# Patient Record
Sex: FEMALE | Race: BLACK OR AFRICAN AMERICAN | Employment: UNEMPLOYED | URBAN - METROPOLITAN AREA
[De-identification: names, ages, dates, MRNs, and addresses within clinical notes are randomized per-mention and may not be internally consistent; named-entity substitution may affect disease eponyms.]

---

## 2017-04-01 PROBLEM — I70.1 RAS (RENAL ARTERY STENOSIS) (HCC): Status: ACTIVE | Noted: 2017-04-01

## 2019-11-04 ENCOUNTER — HOSPITAL ENCOUNTER (EMERGENCY)
Age: 39
Discharge: LEFT BEFORE DISCHARGE | End: 2019-11-04
Payer: MEDICAID

## 2019-11-04 VITALS
HEIGHT: 61 IN | BODY MASS INDEX: 22.66 KG/M2 | SYSTOLIC BLOOD PRESSURE: 193 MMHG | DIASTOLIC BLOOD PRESSURE: 99 MMHG | WEIGHT: 120 LBS | RESPIRATION RATE: 14 BRPM | OXYGEN SATURATION: 95 % | HEART RATE: 98 BPM | TEMPERATURE: 98.3 F

## 2019-11-04 DIAGNOSIS — I10 ESSENTIAL HYPERTENSION: ICD-10-CM

## 2019-11-04 DIAGNOSIS — R20.0 NUMBNESS: Primary | ICD-10-CM

## 2019-11-04 DIAGNOSIS — R29.898 WEAKNESS OF RIGHT HAND: ICD-10-CM

## 2019-11-04 PROCEDURE — 99283 EMERGENCY DEPT VISIT LOW MDM: CPT

## 2019-11-04 RX ORDER — METHYLPREDNISOLONE 4 MG/1
TABLET ORAL
Qty: 1 KIT | Status: SHIPPED | OUTPATIENT
Start: 2019-11-04 | End: 2019-11-10

## 2019-11-07 ENCOUNTER — OFFICE VISIT (OUTPATIENT)
Dept: NEUROLOGY | Age: 39
End: 2019-11-07
Payer: MEDICAID

## 2019-11-07 VITALS
OXYGEN SATURATION: 53 % | SYSTOLIC BLOOD PRESSURE: 195 MMHG | WEIGHT: 125 LBS | DIASTOLIC BLOOD PRESSURE: 102 MMHG | BODY MASS INDEX: 23.6 KG/M2 | HEART RATE: 100 BPM | HEIGHT: 61 IN | RESPIRATION RATE: 18 BRPM

## 2019-11-07 DIAGNOSIS — G56.21 ULNAR NEUROPATHY OF RIGHT UPPER EXTREMITY: ICD-10-CM

## 2019-11-07 DIAGNOSIS — I10 HYPERTENSION, UNSPECIFIED TYPE: ICD-10-CM

## 2019-11-07 DIAGNOSIS — G56.03 BILATERAL CARPAL TUNNEL SYNDROME: Primary | ICD-10-CM

## 2019-11-07 DIAGNOSIS — R29.898 ARM WEAKNESS: ICD-10-CM

## 2019-11-07 PROCEDURE — 1036F TOBACCO NON-USER: CPT | Performed by: CLINICAL NURSE SPECIALIST

## 2019-11-07 PROCEDURE — G8599 NO ASA/ANTIPLAT THER USE RNG: HCPCS | Performed by: CLINICAL NURSE SPECIALIST

## 2019-11-07 PROCEDURE — G8420 CALC BMI NORM PARAMETERS: HCPCS | Performed by: CLINICAL NURSE SPECIALIST

## 2019-11-07 PROCEDURE — G8484 FLU IMMUNIZE NO ADMIN: HCPCS | Performed by: CLINICAL NURSE SPECIALIST

## 2019-11-07 PROCEDURE — G8427 DOCREV CUR MEDS BY ELIG CLIN: HCPCS | Performed by: CLINICAL NURSE SPECIALIST

## 2019-11-07 PROCEDURE — 99205 OFFICE O/P NEW HI 60 MIN: CPT | Performed by: CLINICAL NURSE SPECIALIST

## 2019-11-07 RX ORDER — BUPRENORPHINE AND NALOXONE 8; 2 MG/1; MG/1
FILM, SOLUBLE BUCCAL; SUBLINGUAL
Refills: 0 | COMMUNITY
Start: 2019-10-28

## 2019-11-07 RX ORDER — LAMOTRIGINE 200 MG/1
TABLET ORAL
Refills: 0 | COMMUNITY
Start: 2019-11-05

## 2019-11-07 RX ORDER — OXYBUTYNIN CHLORIDE 5 MG/1
TABLET ORAL
Refills: 0 | COMMUNITY
Start: 2019-10-28

## 2019-11-07 RX ORDER — CARVEDILOL 25 MG/1
TABLET ORAL
Refills: 0 | COMMUNITY
Start: 2019-08-24

## 2019-11-07 RX ORDER — LOSARTAN POTASSIUM 100 MG/1
TABLET ORAL
Refills: 0 | COMMUNITY
Start: 2019-10-28

## 2019-11-08 ENCOUNTER — TELEPHONE (OUTPATIENT)
Dept: NEUROLOGY | Age: 39
End: 2019-11-08

## 2019-11-18 ENCOUNTER — TELEPHONE (OUTPATIENT)
Dept: ADMINISTRATIVE | Age: 39
End: 2019-11-18

## 2019-11-19 ENCOUNTER — TELEPHONE (OUTPATIENT)
Dept: NEUROLOGY | Age: 39
End: 2019-11-19

## 2019-12-05 ENCOUNTER — TELEPHONE (OUTPATIENT)
Dept: NEUROLOGY | Age: 39
End: 2019-12-05

## 2019-12-11 ENCOUNTER — TELEPHONE (OUTPATIENT)
Dept: NEUROLOGY | Age: 39
End: 2019-12-11

## 2019-12-18 ENCOUNTER — TELEPHONE (OUTPATIENT)
Dept: NEUROLOGY | Age: 39
End: 2019-12-18

## 2020-01-06 ENCOUNTER — TELEPHONE (OUTPATIENT)
Dept: NEUROLOGY | Age: 40
End: 2020-01-06

## 2020-01-06 NOTE — TELEPHONE ENCOUNTER
Patient no showed 01/06/2020 with Liset CHASE. Attempted to contact patient to reschedule the appointment, left message. No show letter sent.

## 2020-01-06 NOTE — LETTER
[facility]  [address] [place] [address], [address] [place] [address] Drive  [address] [place] [address]  Phone: [phone]  Fax: [phone]        January 6, 2020     [address] [place], [place] [id number]      Dear Ximena Peña: We are sorry that you missed your appointment with Neo CHASE on 1/6/2020. Your health and follow-up medical care are important to us. We tried to reach you but your voice mailbox is full. Please call our office as soon as possible so that we may reschedule your appointment. If you have already rescheduled your appointment, please disregard this letter.     Sincerely,      BERLIN Padgett on behalf of:  RENA Zhu CNS

## 2020-03-25 PROBLEM — D64.9 ANEMIA: Status: RESOLVED | Noted: 2020-03-25 | Resolved: 2020-03-24

## 2020-05-27 ENCOUNTER — HOSPITAL ENCOUNTER (OUTPATIENT)
Age: 40
Discharge: HOME | End: 2020-05-27
Payer: MEDICAID

## 2020-05-27 LAB
ALBUMIN SERPL-MCNC: 4.3 G/DL (ref 3.5–5.2)
ALP BLD-CCNC: 53 U/L (ref 35–104)
ALT SERPL-CCNC: 9 U/L (ref 0–32)
ANION GAP SERPL CALCULATED.3IONS-SCNC: 13 MMOL/L (ref 7–16)
AST SERPL-CCNC: 14 U/L (ref 0–31)
BASOPHILS ABSOLUTE: 0.04 E9/L (ref 0–0.2)
BASOPHILS RELATIVE PERCENT: 0.6 % (ref 0–2)
BILIRUB SERPL-MCNC: 0.2 MG/DL (ref 0–1.2)
BUN BLDV-MCNC: 12 MG/DL (ref 6–20)
CALCIUM SERPL-MCNC: 8.9 MG/DL (ref 8.6–10.2)
CHLORIDE BLD-SCNC: 100 MMOL/L (ref 98–107)
CHOLESTEROL, TOTAL: 170 MG/DL (ref 0–199)
CO2: 27 MMOL/L (ref 22–29)
CREAT SERPL-MCNC: 0.9 MG/DL (ref 0.5–1)
EOSINOPHILS ABSOLUTE: 0.02 E9/L (ref 0.05–0.5)
EOSINOPHILS RELATIVE PERCENT: 0.3 % (ref 0–6)
GFR AFRICAN AMERICAN: >60
GFR NON-AFRICAN AMERICAN: >60 ML/MIN/1.73
GLUCOSE BLD-MCNC: 94 MG/DL (ref 74–99)
HBA1C MFR BLD: 5.6 % (ref 4–5.6)
HCT VFR BLD CALC: 30.9 % (ref 34–48)
HDLC SERPL-MCNC: 75 MG/DL
HEMOGLOBIN: 9.9 G/DL (ref 11.5–15.5)
IMMATURE GRANULOCYTES #: 0.01 E9/L
IMMATURE GRANULOCYTES %: 0.1 % (ref 0–5)
LDL CHOLESTEROL CALCULATED: 86 MG/DL (ref 0–99)
LYMPHOCYTES ABSOLUTE: 2.27 E9/L (ref 1.5–4)
LYMPHOCYTES RELATIVE PERCENT: 31.9 % (ref 20–42)
MCH RBC QN AUTO: 32.6 PG (ref 26–35)
MCHC RBC AUTO-ENTMCNC: 32 % (ref 32–34.5)
MCV RBC AUTO: 101.6 FL (ref 80–99.9)
MONOCYTES ABSOLUTE: 0.49 E9/L (ref 0.1–0.95)
MONOCYTES RELATIVE PERCENT: 6.9 % (ref 2–12)
NEUTROPHILS ABSOLUTE: 4.29 E9/L (ref 1.8–7.3)
NEUTROPHILS RELATIVE PERCENT: 60.2 % (ref 43–80)
PDW BLD-RTO: 12.1 FL (ref 11.5–15)
PHOSPHORUS: 3.2 MG/DL (ref 2.5–4.5)
PLATELET # BLD: 250 E9/L (ref 130–450)
PMV BLD AUTO: 11.6 FL (ref 7–12)
POTASSIUM SERPL-SCNC: 3.6 MMOL/L (ref 3.5–5)
RBC # BLD: 3.04 E12/L (ref 3.5–5.5)
SODIUM BLD-SCNC: 140 MMOL/L (ref 132–146)
TOTAL PROTEIN: 6.4 G/DL (ref 6.4–8.3)
TRIGL SERPL-MCNC: 47 MG/DL (ref 0–149)
VITAMIN D 25-HYDROXY: 6 NG/ML (ref 30–100)
VLDLC SERPL CALC-MCNC: 9 MG/DL
WBC # BLD: 7.1 E9/L (ref 4.5–11.5)

## 2020-05-27 PROCEDURE — 80061 LIPID PANEL: CPT

## 2020-05-27 PROCEDURE — 36415 COLL VENOUS BLD VENIPUNCTURE: CPT

## 2020-05-27 PROCEDURE — 85025 COMPLETE CBC W/AUTO DIFF WBC: CPT

## 2020-05-27 PROCEDURE — 83036 HEMOGLOBIN GLYCOSYLATED A1C: CPT

## 2020-05-27 PROCEDURE — 84100 ASSAY OF PHOSPHORUS: CPT

## 2020-05-27 PROCEDURE — 82306 VITAMIN D 25 HYDROXY: CPT

## 2020-05-27 PROCEDURE — 80053 COMPREHEN METABOLIC PANEL: CPT

## 2020-09-14 ENCOUNTER — HOSPITAL ENCOUNTER (OUTPATIENT)
Age: 40
Discharge: HOME | End: 2020-09-14
Payer: MEDICAID

## 2020-09-14 LAB
ALBUMIN SERPL-MCNC: 3.6 G/DL (ref 3.5–5.2)
ALP BLD-CCNC: 47 U/L (ref 35–104)
ALT SERPL-CCNC: 8 U/L (ref 0–32)
ANION GAP SERPL CALCULATED.3IONS-SCNC: 10 MMOL/L (ref 7–16)
AST SERPL-CCNC: 13 U/L (ref 0–31)
BASOPHILS ABSOLUTE: 0.03 E9/L (ref 0–0.2)
BASOPHILS RELATIVE PERCENT: 0.7 % (ref 0–2)
BILIRUB SERPL-MCNC: 0.2 MG/DL (ref 0–1.2)
BUN BLDV-MCNC: 6 MG/DL (ref 6–20)
CALCIUM SERPL-MCNC: 9 MG/DL (ref 8.6–10.2)
CHLORIDE BLD-SCNC: 102 MMOL/L (ref 98–107)
CHOLESTEROL, TOTAL: 169 MG/DL (ref 0–199)
CO2: 28 MMOL/L (ref 22–29)
CREAT SERPL-MCNC: 0.8 MG/DL (ref 0.5–1)
EOSINOPHILS ABSOLUTE: 0.02 E9/L (ref 0.05–0.5)
EOSINOPHILS RELATIVE PERCENT: 0.5 % (ref 0–6)
GFR AFRICAN AMERICAN: >60
GFR NON-AFRICAN AMERICAN: >60 ML/MIN/1.73
GLUCOSE BLD-MCNC: 98 MG/DL (ref 74–99)
HBA1C MFR BLD: 4.8 % (ref 4–5.6)
HCT VFR BLD CALC: 31.7 % (ref 34–48)
HDLC SERPL-MCNC: 92 MG/DL
HEMOGLOBIN: 10.1 G/DL (ref 11.5–15.5)
IMMATURE GRANULOCYTES #: 0.02 E9/L
IMMATURE GRANULOCYTES %: 0.5 % (ref 0–5)
LDL CHOLESTEROL CALCULATED: 68 MG/DL (ref 0–99)
LYMPHOCYTES ABSOLUTE: 1.71 E9/L (ref 1.5–4)
LYMPHOCYTES RELATIVE PERCENT: 40.4 % (ref 20–42)
MCH RBC QN AUTO: 32 PG (ref 26–35)
MCHC RBC AUTO-ENTMCNC: 31.9 % (ref 32–34.5)
MCV RBC AUTO: 100.3 FL (ref 80–99.9)
MONOCYTES ABSOLUTE: 0.35 E9/L (ref 0.1–0.95)
MONOCYTES RELATIVE PERCENT: 8.3 % (ref 2–12)
NEUTROPHILS ABSOLUTE: 2.1 E9/L (ref 1.8–7.3)
NEUTROPHILS RELATIVE PERCENT: 49.6 % (ref 43–80)
PDW BLD-RTO: 13.1 FL (ref 11.5–15)
PHOSPHORUS: 2.6 MG/DL (ref 2.5–4.5)
PLATELET # BLD: 307 E9/L (ref 130–450)
PMV BLD AUTO: 10.4 FL (ref 7–12)
POTASSIUM SERPL-SCNC: 3.9 MMOL/L (ref 3.5–5)
RBC # BLD: 3.16 E12/L (ref 3.5–5.5)
SODIUM BLD-SCNC: 140 MMOL/L (ref 132–146)
TOTAL PROTEIN: 5.9 G/DL (ref 6.4–8.3)
TRIGL SERPL-MCNC: 43 MG/DL (ref 0–149)
VITAMIN D 25-HYDROXY: 15 NG/ML (ref 30–100)
VLDLC SERPL CALC-MCNC: 9 MG/DL
WBC # BLD: 4.2 E9/L (ref 4.5–11.5)

## 2020-09-14 PROCEDURE — 84100 ASSAY OF PHOSPHORUS: CPT

## 2020-09-14 PROCEDURE — 82306 VITAMIN D 25 HYDROXY: CPT

## 2020-09-14 PROCEDURE — 80053 COMPREHEN METABOLIC PANEL: CPT

## 2020-09-14 PROCEDURE — 36415 COLL VENOUS BLD VENIPUNCTURE: CPT

## 2020-09-14 PROCEDURE — 83036 HEMOGLOBIN GLYCOSYLATED A1C: CPT

## 2020-09-14 PROCEDURE — 80061 LIPID PANEL: CPT

## 2020-09-14 PROCEDURE — 85025 COMPLETE CBC W/AUTO DIFF WBC: CPT

## 2020-11-03 PROBLEM — I10 ESSENTIAL HYPERTENSION: Status: RESOLVED | Noted: 2020-11-03 | Resolved: 2020-11-03

## 2022-10-24 ENCOUNTER — NURSE TRIAGE (OUTPATIENT)
Dept: OTHER | Facility: CLINIC | Age: 42
End: 2022-10-24

## 2022-10-24 NOTE — TELEPHONE ENCOUNTER
Location of patient: [address] call from Michelleketty Gonzalez at [facility] with [employer]. Subjective: Caller states \"I have numbness in my hands\"     Current Symptoms: Was diagnosed with carpal tunnel several years ago via nerve conduction study tests and was recommended to have surgery which she did not follow thru with due to anxiety about it . Hands are always numb. L hand is worse. Fingertips go completely numb when holding things. Inverted vertebrae on L lower spine and is starting to flare up again. Onset: several years ago; worsening    Associated Symptoms: reduced activity    Pain Severity: 4/10; numbness, tingling, pins and needles; constant, waxing and waning, moderate    Temperature: denies fever     What has been tried:     LMP: NA Pregnant: No    Recommended disposition: See in Office Today/UCC or Walk In as back up. Care advice provided, patient verbalizes understanding; denies any other questions or concerns; instructed to call back for any new or worsening symptoms. Patient/Caller agrees with recommended disposition; writer provided warm transfer to [facility] at [facility] for appointment scheduling    Attention Provider: Thank you for allowing me to participate in the care of your patient. The patient was connected to triage in response to information provided to the ECC/PSC. Please do not respond through this encounter as the response is not directed to a shared pool.     Reason for Disposition   Patient wants to be seen    Protocols used: Neurologic Deficit-ADULT-[place]

## 2022-11-18 ENCOUNTER — OFFICE VISIT (OUTPATIENT)
Dept: PRIMARY CARE CLINIC | Age: 42
End: 2022-11-18
Payer: MEDICAID

## 2022-11-18 VITALS
BODY MASS INDEX: 27.42 KG/M2 | WEIGHT: 149 LBS | SYSTOLIC BLOOD PRESSURE: 146 MMHG | HEART RATE: 81 BPM | OXYGEN SATURATION: 99 % | RESPIRATION RATE: 18 BRPM | DIASTOLIC BLOOD PRESSURE: 106 MMHG | HEIGHT: 62 IN

## 2022-11-18 DIAGNOSIS — N92.6 MENSTRUAL IRREGULARITY: ICD-10-CM

## 2022-11-18 DIAGNOSIS — I10 HYPERTENSION, UNSPECIFIED TYPE: ICD-10-CM

## 2022-11-18 DIAGNOSIS — I10 HYPERTENSION, UNSPECIFIED TYPE: Primary | ICD-10-CM

## 2022-11-18 DIAGNOSIS — E55.9 VITAMIN D DEFICIENCY: ICD-10-CM

## 2022-11-18 DIAGNOSIS — G56.03 BILATERAL CARPAL TUNNEL SYNDROME: ICD-10-CM

## 2022-11-18 LAB
ALBUMIN SERPL-MCNC: 4.3 G/DL (ref 3.5–5.2)
ALP BLD-CCNC: 53 U/L (ref 35–104)
ALT SERPL-CCNC: 11 U/L (ref 0–32)
ANION GAP SERPL CALCULATED.3IONS-SCNC: 14 MMOL/L (ref 7–16)
AST SERPL-CCNC: 20 U/L (ref 0–31)
BASOPHILS ABSOLUTE: 0.05 E9/L (ref 0–0.2)
BASOPHILS RELATIVE PERCENT: 0.8 % (ref 0–2)
BILIRUB SERPL-MCNC: 0.5 MG/DL (ref 0–1.2)
BUN BLDV-MCNC: 11 MG/DL (ref 6–20)
CALCIUM SERPL-MCNC: 9.8 MG/DL (ref 8.6–10.2)
CHLORIDE BLD-SCNC: 102 MMOL/L (ref 98–107)
CHOLESTEROL, TOTAL: 213 MG/DL (ref 0–199)
CO2: 22 MMOL/L (ref 22–29)
CREAT SERPL-MCNC: 0.9 MG/DL (ref 0.5–1)
EOSINOPHILS ABSOLUTE: 0.01 E9/L (ref 0.05–0.5)
EOSINOPHILS RELATIVE PERCENT: 0.2 % (ref 0–6)
GFR SERPL CREATININE-BSD FRML MDRD: >60 ML/MIN/1.73
GLUCOSE BLD-MCNC: 86 MG/DL (ref 74–99)
HCT VFR BLD CALC: 32.8 % (ref 34–48)
HDLC SERPL-MCNC: 111 MG/DL
HEMOGLOBIN: 10.8 G/DL (ref 11.5–15.5)
IMMATURE GRANULOCYTES #: 0.02 E9/L
IMMATURE GRANULOCYTES %: 0.3 % (ref 0–5)
LDL CHOLESTEROL CALCULATED: 89 MG/DL (ref 0–99)
LYMPHOCYTES ABSOLUTE: 2.69 E9/L (ref 1.5–4)
LYMPHOCYTES RELATIVE PERCENT: 45.1 % (ref 20–42)
MCH RBC QN AUTO: 29.7 PG (ref 26–35)
MCHC RBC AUTO-ENTMCNC: 32.9 % (ref 32–34.5)
MCV RBC AUTO: 90.1 FL (ref 80–99.9)
MONOCYTES ABSOLUTE: 0.38 E9/L (ref 0.1–0.95)
MONOCYTES RELATIVE PERCENT: 6.4 % (ref 2–12)
NEUTROPHILS ABSOLUTE: 2.81 E9/L (ref 1.8–7.3)
NEUTROPHILS RELATIVE PERCENT: 47.2 % (ref 43–80)
PDW BLD-RTO: 15.6 FL (ref 11.5–15)
PLATELET # BLD: 289 E9/L (ref 130–450)
PMV BLD AUTO: 12.3 FL (ref 7–12)
POTASSIUM SERPL-SCNC: 4.1 MMOL/L (ref 3.5–5)
RBC # BLD: 3.64 E12/L (ref 3.5–5.5)
SODIUM BLD-SCNC: 138 MMOL/L (ref 132–146)
TOTAL PROTEIN: 6.7 G/DL (ref 6.4–8.3)
TRIGL SERPL-MCNC: 65 MG/DL (ref 0–149)
TSH SERPL DL<=0.05 MIU/L-ACNC: 1.32 UIU/ML (ref 0.27–4.2)
VITAMIN D 25-HYDROXY: 14 NG/ML (ref 30–100)
VLDLC SERPL CALC-MCNC: 13 MG/DL
WBC # BLD: 6 E9/L (ref 4.5–11.5)

## 2022-11-18 PROCEDURE — 81025 URINE PREGNANCY TEST: CPT | Performed by: STUDENT IN AN ORGANIZED HEALTH CARE EDUCATION/TRAINING PROGRAM

## 2022-11-18 PROCEDURE — 3078F DIAST BP <80 MM HG: CPT | Performed by: STUDENT IN AN ORGANIZED HEALTH CARE EDUCATION/TRAINING PROGRAM

## 2022-11-18 PROCEDURE — 99204 OFFICE O/P NEW MOD 45 MIN: CPT | Performed by: STUDENT IN AN ORGANIZED HEALTH CARE EDUCATION/TRAINING PROGRAM

## 2022-11-18 PROCEDURE — 1036F TOBACCO NON-USER: CPT | Performed by: STUDENT IN AN ORGANIZED HEALTH CARE EDUCATION/TRAINING PROGRAM

## 2022-11-18 PROCEDURE — G8427 DOCREV CUR MEDS BY ELIG CLIN: HCPCS | Performed by: STUDENT IN AN ORGANIZED HEALTH CARE EDUCATION/TRAINING PROGRAM

## 2022-11-18 PROCEDURE — 3074F SYST BP LT 130 MM HG: CPT | Performed by: STUDENT IN AN ORGANIZED HEALTH CARE EDUCATION/TRAINING PROGRAM

## 2022-11-18 PROCEDURE — G8484 FLU IMMUNIZE NO ADMIN: HCPCS | Performed by: STUDENT IN AN ORGANIZED HEALTH CARE EDUCATION/TRAINING PROGRAM

## 2022-11-18 PROCEDURE — G8419 CALC BMI OUT NRM PARAM NOF/U: HCPCS | Performed by: STUDENT IN AN ORGANIZED HEALTH CARE EDUCATION/TRAINING PROGRAM

## 2022-11-18 RX ORDER — CHLORTHALIDONE 25 MG/1
TABLET ORAL
COMMUNITY
Start: 2022-09-11

## 2022-11-18 RX ORDER — LABETALOL 200 MG/1
TABLET, FILM COATED ORAL
COMMUNITY
Start: 2022-09-11 | End: 2022-11-18

## 2022-11-18 RX ORDER — LOSARTAN POTASSIUM 100 MG/1
100 TABLET ORAL DAILY
Qty: 30 TABLET | Refills: 0
Start: 2022-11-18

## 2022-11-18 NOTE — PROGRESS NOTES
[facility]  Department of Family Medicine      Patient:  Obdulia Pringle 40 y.o. female     Date of Service: 22      Chief complaint:   Chief Complaint   Patient presents with    Establish Care     HTN    Carpal Tunnel     Had an EGD done and was told she needed surgery but never got it done. she needs an order for an EGD now. History ofPresent Illness   The patient is a 40 y.o. female  presented to the clinic with complaints as above.     Bilateral Carpal tunnel  -chronic issue  -diagnosed years ago by EMG, needs repeat  -has been dropping stuff     HTN   -f/u  -following with Dr. Rodney Saldivar (nephrology)  -has been out of her medication for 3 months now  -has only been taking the HCTZ, not taking anything else as they made her not feel well     Menstrual irregularities  -has had IUD for years now, states hasn't had period for a year, has had a menstrual period for two weeks now     Past Medical History:      Diagnosis Date    Abnormal Pap smear     Anemia     Bradycardia     Cardiomyopathy ([address]     Hypertension     Migraine headache     miscarriage     multiple       PastSurgical History:        Procedure Laterality Date     SECTION, LOW TRANSVERSE Bilateral 05    GASTRIC BYPASS SURGERY         Allergies:    Norvasc [amlodipine]    Social History:   Social History     Socioeconomic History    Marital status: Legally      Spouse name: Not on file    Number of children: Not on file    Years of education: Not on file    Highest education level: Not on file   Occupational History    Not on file   Tobacco Use    Smoking status: Never    Smokeless tobacco: Never    Tobacco comments:     smokes cigars   Substance and Sexual Activity    Alcohol use: No     Comment: socially    Drug use: No    Sexual activity: Yes     Partners: Male   Other Topics Concern    Not on file   Social History Narrative    Not on file     Social Determinants of Health     Financial Resource Strain: Not on file   Food Insecurity: Not on file   Transportation Needs: Not on file   Physical Activity: Not on file   Stress: Not on file   Social Connections: Not on file   Intimate Partner Violence: Not on file   Housing Stability: Not on file        Family History:       Problem Relation Age of Onset    High Blood Pressure Other     Diabetes Other     High Blood Pressure Mother     Heart Failure Mother     Cancer Mother     Diabetes Mother     High Blood Pressure Father     Cancer Father     Diabetes Father     Cancer Maternal Grandfather        Review of Systems:   Review of Systems - as above     Physical Exam   Vitals: BP (!) 146/106   Pulse 81   Resp 18   Ht 5' 2\" (1.575 m)   Wt 149 lb (67.6 kg)   SpO2 99%   BMI 27.25 kg/m²   Physical Exam  Constitutional:       Appearance: She is well-developed. HENT:      Head: Normocephalic. Cardiovascular:      Rate and Rhythm: Normal rate and regular rhythm. Heart sounds: Normal heart sounds. No murmur heard. Pulmonary:      Effort: Pulmonary effort is normal. No respiratory distress. Breath sounds: Normal breath sounds. No wheezing. Abdominal:      General: Bowel sounds are normal.      Palpations: Abdomen is soft. Musculoskeletal:         General: Normal range of motion. Skin:     General: Skin is warm and dry. Neurological:      Mental Status: She is alert and oriented to person, place, and time. Comments: Carpal tunnel compression positive bilaterally  Phalens positive bilaterally   Psychiatric:         Behavior: Behavior normal.           Assessment and Plan       1. Hypertension, unspecified type  Chronic issue  -Only taking her HCTZ, not taking her other medications, advised BP still high and restarted losartan with close f/u  - Comprehensive Metabolic Panel; Future  - Lipid Panel; Future  - losartan (COZAAR) 100 MG tablet; Take 1 tablet by mouth daily  Dispense: 30 tablet; Refill: 0    2.  Bilateral carpal tunnel syndrome  Chronic issue  -Worsening as pain is worsening and is dropping things now, thus got repeat EMG, and if severe will refer to hand surgeon  - EMG; Future    3. Vitamin D deficiency  Chronic issue  -Needs repeat lab work as below  - Vitamin D 25 Hydroxy; Future    4. Menstrual irregularity  New issue  -Has not had a period for a while and now having one for two weeks, thus referred to ob/gyn and got baseline labs as below with close f/u in 2 weeks  - CBC with Auto Differential; Future  - POCT urine pregnancy  - TSH; Future    Counseled regarding above diagnosis, including possible risks and complications,  especially if left uncontrolled. Counseled regarding the possible side effects, risks, benefits and alternatives to treatment;patient and/or guardian verbalizes understanding, agrees, feels comfortable with and wishes to proceed with above treatment plan. Call or go to [address] [place] if symptoms worsen or persist. Advised patient to call with any new medication issues, and, as applicable, read all Rx info from pharmacy to assure aware of all possible risks and side effects of medicationbefore taking. Patient and/or guardian given opportunity to ask questions/raise concerns. The patient verbalized comfort and understanding ofinstructions. I encourage further reading and education about your health conditions. Information on many health conditions is provided by [facility] [employer] of [employer]: [email or web address]. org/  Please bring any questions to me at your nextvisit. Return to Office: Return in about 2 weeks (around 12/2/2022) for f/u HTN .     Medication List:    Current Outpatient Medications   Medication Sig Dispense Refill    losartan (COZAAR) 100 MG tablet Take 1 tablet by mouth daily 30 tablet 0    chlorthalidone (HYGROTON) 25 MG tablet take 1 tablet by mouth once daily (Patient not taking: Reported on 11/18/2022)      oxybutynin (DITROPAN) 5 MG tablet  (Patient not taking: Reported on 11/18/2022)  0    buprenorphine-naloxone (SUBOXONE) 8-2 MG FILM SL film dissolve 1 FILM under the tongue twice a day FOR 21 DAYS (Patient not taking: Reported on 11/18/2022)  0    iron sucrose (VENOFER) 20 MG/ML injection Infuse 100 mg intravenously every 3 months (Patient not taking: Reported on 11/18/2022)       No current facility-administered medications for this visit. Ivette Delgado, DO       This document may have been prepared at least partially through the use of voice recognition software. Although effort is taken to assure the accuracy ofthis document, it is possible that grammatical, syntax,  or spelling errors may occur.

## 2022-12-12 ENCOUNTER — HOSPITAL ENCOUNTER (OUTPATIENT)
Dept: NEUROLOGY | Age: 42
Discharge: HOME | End: 2022-12-12
Payer: MEDICAID

## 2022-12-12 DIAGNOSIS — G56.03 BILATERAL CARPAL TUNNEL SYNDROME: ICD-10-CM

## 2022-12-12 PROCEDURE — 95910 NRV CNDJ TEST 7-8 STUDIES: CPT

## 2022-12-12 PROCEDURE — 95886 MUSC TEST DONE W/N TEST COMP: CPT

## 2022-12-12 NOTE — PROCEDURES
[facility]  [facility], [address] [place] [address]  Phone: [phone]  Fax: [phone]      Referring Provider: Kamala Moon DO  Primary Care Physician: Darshana Damico DO  Patient Name: Thee Andino  Patient YOB: 1980  Gender: female  BMI: There is no height or weight on file to calculate BMI.  not currently breastfeeding. 12/12/2022    Reason for referral: EMG    Description of clinical problem:   No chief complaint on file. Pain Yes   ; Numbness/tingling  Yes; Weakness  No       Brief physical exam:   Sensory deficit No; Weakness No; Atrophy  Yes; Reflex abnormality No      Study Limitations:  none    Summary of Findings:   Nerve conduction studies: The following nerve conduction studies were abnormal:   See table and interpretation  All other nerve conduction studies listed in the table above were normal in latency, amplitude and conduction velocity. [address]  Electrodiagnostic Laboratory   [place]         Full Name: Enedina Browne Gender: Female  MRN: [id number] YOB: 1980  [facility]      Visit Date: 12/12/2022 10:06  Age: 40 Years 6 Months Old  Examining Physician: Dr. Kylah Martinez   Referring Physician: Dr. Aaron Guzman  Technician: Veronica Lorenzo   Height: 5 feet 2 inch  Weight: 138 lbs  Notes: Carpal Tunnel Syndrome (Bilateral)      Motor NCS      Nerve / Sites Lat. Lat Diff Amplitude Amp. 1-2 Distance Velocity Temp.    ms ms mV % cm m/s °C   R Median - APB      Wrist 7.92  7.0 100 8  33      Elbow 12.19 4.27 7.7 110 20 47 33   L Median - APB      Wrist 7.71  8.6 100 8  33.9      Elbow 12.55 4.84 6.4 75 20 41 33.9   R Ulnar - ADM      Wrist 2.81  9.8 100 8  33.8      B. Elbow 5.99 3.18 9.6 97.7 17 54 33.6      A. Elbow 7.60 1.61 9.2 93.3 10 62 33.5       Sensory NCS      Nerve / Sites Onset Lat Peak Lat PP Amp Distance Velocity Temp.    ms ms µV cm m/s °C   R Median - Digit II (Antidromic)      Mid Palm 1.93 2.81 15.6 7 36 33.8      Wrist 5.00 6.04 11.0 14 28 33.8   L Median - Digit II (Antidromic)      Mid Palm 1.77 2.60 14.4 7 40 34      Wrist 4.32 5.42 10.0 14 32 34   R Ulnar - Digit V (Antidromic)      Wrist 2.92 3.80 44.4 14 48 33.8   R Radial - Anatomical snuff box (Forearm)      Forearm 1.88 2.55 38.6 10 53 33.6       F  Wave      Nerve F Lat M Lat F-M Lat    ms ms ms   R Median - APB 35.1 7.1 28.0   R Ulnar - ADM 26.4 2.9 23.4   L Median - APB 31.7 7.8 23.9       EMG         EMG Summary Table     Spontaneous MUAP Recruitment   Muscle IA Fib PSW Fasc H.F. Amp Dur. PPP Pattern   L. Abductor pollicis brevis N +1 None None None N N N N   L. First dorsal interosseous N None None None None N N N N   L. Brachioradialis N None None None None N N N N   L. Biceps brachii N None None None None N N N N   L. Deltoid N None None None None N N N N   L. Triceps brachii N None None None None N N N N                   Needle EMG:   Needle EMG was performed using a monopolar needle. The following abnormalities were seen on needle EMG: denervation potentials noted left abductor pollicis brevis muscle. All other muscles tested, as listed in the table above demonstrated normal amplitude, duration, phases and recruitment and no active denervation signs were seen. Diagnostic Interpretation:   Moderate to severe bilateral carpal tunnel syndrome. Clinical correlation advised. Technologist: Catina Corral MD    Nerve conduction studies and electromyography were performed according to our laboratory policies and procedures which can be provided upon request. All abnormal values are identified in the table.  Laboratory normal values can also be provided upon request.       Cc: Lucy Acevedo, DO Narinder Whitney DO

## 2022-12-13 ENCOUNTER — TELEPHONE (OUTPATIENT)
Dept: PRIMARY CARE CLINIC | Age: 42
End: 2022-12-13

## 2022-12-13 DIAGNOSIS — G56.03 BILATERAL CARPAL TUNNEL SYNDROME: Primary | ICD-10-CM

## 2022-12-13 NOTE — TELEPHONE ENCOUNTER
Pt notified regarding EMG results    Zhao RaineyDO   12/12/2022 11:17 AM EST       Mod to severe bilateral carpal tunnel, can refer to hand surgeon if patient agreeable. Thank You,  Cata Montes     Pt verbalized understanding and was told that she can see Dr Brown Bailey.  Please place referral

## 2022-12-14 NOTE — PROGRESS NOTES
[facility]  Department of Family Medicine      Patient:  Ryan Mendez 40 y.o. female     Date of Service: 22      Chief complaint:   Chief Complaint   Patient presents with    Follow-up    Hypertension           History ofPresent Illness   The patient is a 40 y.o. female  presented to the clinic with complaints as above.     HTN   -f/u  -restarted on losartan during last visit  -currently, taking both losartan and chlorthalidone  -denies any lightheadedness, dizziness, or chest pain     Past Medical History:      Diagnosis Date    Abnormal Pap smear     Anemia     Bradycardia     Cardiomyopathy ([facility])     Hypertension     Migraine headache     miscarriage     multiple       PastSurgical History:        Procedure Laterality Date     SECTION, LOW TRANSVERSE Bilateral 05    GASTRIC BYPASS SURGERY         Allergies:    Norvasc [amlodipine]    Social History:   Social History     Socioeconomic History    Marital status: Legally      Spouse name: Not on file    Number of children: Not on file    Years of education: Not on file    Highest education level: Not on file   Occupational History    Not on file   Tobacco Use    Smoking status: Never    Smokeless tobacco: Never    Tobacco comments:     smokes cigars   Substance and Sexual Activity    Alcohol use: No     Comment: socially    Drug use: No    Sexual activity: Yes     Partners: Male   Other Topics Concern    Not on file   Social History Narrative    Not on file     Social Determinants of Health     Financial Resource Strain: High Risk    Difficulty of Paying Living Expenses: Hard   Food Insecurity: No Food Insecurity    Worried About Running Out of Food in the Last Year: Never true    Ran Out of Food in the Last Year: Never true   Transportation Needs: Not on file   Physical Activity: Not on file   Stress: Not on file   Social Connections: Not on file   Intimate Partner Violence: Not on file   Housing Stability: Not on file        Family History:       Problem Relation Age of Onset    High Blood Pressure Other     Diabetes Other     High Blood Pressure Mother     Heart Failure Mother     Cancer Mother     Diabetes Mother     High Blood Pressure Father     Cancer Father     Diabetes Father     Cancer Maternal Grandfather        Review of Systems:   Review of Systems - as above     Physical Exam   Vitals: BP (!) 144/90   Pulse 50   Temp 96.8 °F (36 °C)   Resp 18   Ht 5' 2\" (1.575 m)   Wt 146 lb (66.2 kg)   SpO2 99%   BMI 26.70 kg/m²   Physical Exam  Constitutional:       Appearance: She is well-developed. HENT:      Head: Normocephalic. Cardiovascular:      Rate and Rhythm: Normal rate and regular rhythm. Heart sounds: Normal heart sounds. No murmur heard. Pulmonary:      Effort: Pulmonary effort is normal. No respiratory distress. Breath sounds: Normal breath sounds. No wheezing. Abdominal:      General: Bowel sounds are normal.      Palpations: Abdomen is soft. Musculoskeletal:         General: Normal range of motion. Skin:     General: Skin is warm and dry. Neurological:      Mental Status: She is alert and oriented to person, place, and time. Psychiatric:         Behavior: Behavior normal.           Assessment and Plan       1. Hypertension, unspecified type  F/u of chronic issue  -Slightly elevated in office, thus increased chlorthalidone as below with 1 week f/u for nurses BP check  - chlorthalidone (HYGROTEN) 50 MG tablet; Take daily  Dispense: 30 tablet; Refill: 0    Counseled regarding above diagnosis, including possible risks and complications,  especially if left uncontrolled. Counseled regarding the possible side effects, risks, benefits and alternatives to treatment;patient and/or guardian verbalizes understanding, agrees, feels comfortable with and wishes to proceed with above treatment plan.     Call or go to [address] if symptoms worsen or persist. Advised patient to call with any new medication issues, and, as applicable, read all Rx info from pharmacy to assure aware of all possible risks and side effects of medicationbefore taking. Patient and/or guardian given opportunity to ask questions/raise concerns. The patient verbalized comfort and understanding ofinstructions. I encourage further reading and education about your health conditions. Information on many health conditions is provided by [facility] [employer] of Family Physicians: https://familydoctor. org/  Please bring any questions to me at your nextvisit. Return to Office: Return in about 1 month (around 1/15/2023) for f/u HTN, see back in 1 week for nurses bp check . Medication List:    Current Outpatient Medications   Medication Sig Dispense Refill    chlorthalidone (HYGROTEN) 50 MG tablet Take daily 30 tablet 0    losartan (COZAAR) 100 MG tablet Take 1 tablet by mouth daily 30 tablet 0    iron sucrose (VENOFER) 20 MG/ML injection Infuse 100 mg intravenously every 3 months       No current facility-administered medications for this visit. Jennifer Jaramillo, DO       This document may have been prepared at least partially through the use of voice recognition software. Although effort is taken to assure the accuracy ofthis document, it is possible that grammatical, syntax,  or spelling errors may occur.

## 2022-12-15 ENCOUNTER — OFFICE VISIT (OUTPATIENT)
Dept: PRIMARY CARE CLINIC | Age: 42
End: 2022-12-15
Payer: MEDICAID

## 2022-12-15 VITALS
RESPIRATION RATE: 18 BRPM | SYSTOLIC BLOOD PRESSURE: 144 MMHG | OXYGEN SATURATION: 99 % | DIASTOLIC BLOOD PRESSURE: 90 MMHG | TEMPERATURE: 96.8 F | WEIGHT: 146 LBS | HEIGHT: 62 IN | BODY MASS INDEX: 26.87 KG/M2 | HEART RATE: 50 BPM

## 2022-12-15 DIAGNOSIS — I10 HYPERTENSION, UNSPECIFIED TYPE: Primary | ICD-10-CM

## 2022-12-15 PROCEDURE — 3078F DIAST BP <80 MM HG: CPT | Performed by: STUDENT IN AN ORGANIZED HEALTH CARE EDUCATION/TRAINING PROGRAM

## 2022-12-15 PROCEDURE — G8427 DOCREV CUR MEDS BY ELIG CLIN: HCPCS | Performed by: STUDENT IN AN ORGANIZED HEALTH CARE EDUCATION/TRAINING PROGRAM

## 2022-12-15 PROCEDURE — G8484 FLU IMMUNIZE NO ADMIN: HCPCS | Performed by: STUDENT IN AN ORGANIZED HEALTH CARE EDUCATION/TRAINING PROGRAM

## 2022-12-15 PROCEDURE — G8419 CALC BMI OUT NRM PARAM NOF/U: HCPCS | Performed by: STUDENT IN AN ORGANIZED HEALTH CARE EDUCATION/TRAINING PROGRAM

## 2022-12-15 PROCEDURE — 3074F SYST BP LT 130 MM HG: CPT | Performed by: STUDENT IN AN ORGANIZED HEALTH CARE EDUCATION/TRAINING PROGRAM

## 2022-12-15 PROCEDURE — 1036F TOBACCO NON-USER: CPT | Performed by: STUDENT IN AN ORGANIZED HEALTH CARE EDUCATION/TRAINING PROGRAM

## 2022-12-15 PROCEDURE — 99213 OFFICE O/P EST LOW 20 MIN: CPT | Performed by: STUDENT IN AN ORGANIZED HEALTH CARE EDUCATION/TRAINING PROGRAM

## 2022-12-15 RX ORDER — CHLORTHALIDONE 50 MG/1
TABLET ORAL
Qty: 30 TABLET | Refills: 0 | Status: SHIPPED | OUTPATIENT
Start: 2022-12-15

## 2022-12-15 SDOH — ECONOMIC STABILITY: FOOD INSECURITY: WITHIN THE PAST 12 MONTHS, THE FOOD YOU BOUGHT JUST DIDN'T LAST AND YOU DIDN'T HAVE MONEY TO GET MORE.: NEVER TRUE

## 2022-12-15 SDOH — ECONOMIC STABILITY: FOOD INSECURITY: WITHIN THE PAST 12 MONTHS, YOU WORRIED THAT YOUR FOOD WOULD RUN OUT BEFORE YOU GOT MONEY TO BUY MORE.: NEVER TRUE

## 2022-12-15 ASSESSMENT — PATIENT HEALTH QUESTIONNAIRE - PHQ9
SUM OF ALL RESPONSES TO PHQ QUESTIONS 1-9: 1
SUM OF ALL RESPONSES TO PHQ QUESTIONS 1-9: 1
SUM OF ALL RESPONSES TO PHQ9 QUESTIONS 1 & 2: 1
1. LITTLE INTEREST OR PLEASURE IN DOING THINGS: 1
2. FEELING DOWN, DEPRESSED OR HOPELESS: 0
SUM OF ALL RESPONSES TO PHQ QUESTIONS 1-9: 1
SUM OF ALL RESPONSES TO PHQ QUESTIONS 1-9: 1

## 2022-12-15 ASSESSMENT — SOCIAL DETERMINANTS OF HEALTH (SDOH): HOW HARD IS IT FOR YOU TO PAY FOR THE VERY BASICS LIKE FOOD, HOUSING, MEDICAL CARE, AND HEATING?: HARD

## 2022-12-15 ASSESSMENT — LIFESTYLE VARIABLES
HOW MANY STANDARD DRINKS CONTAINING ALCOHOL DO YOU HAVE ON A TYPICAL DAY: 1 OR 2
HOW OFTEN DO YOU HAVE A DRINK CONTAINING ALCOHOL: MONTHLY OR LESS

## 2023-01-18 ENCOUNTER — OFFICE VISIT (OUTPATIENT)
Dept: ORTHOPEDIC SURGERY | Age: 43
End: 2023-01-18

## 2023-01-18 VITALS — BODY MASS INDEX: 26.87 KG/M2 | WEIGHT: 146 LBS | HEIGHT: 62 IN

## 2023-01-18 DIAGNOSIS — G56.03 BILATERAL CARPAL TUNNEL SYNDROME: Primary | ICD-10-CM

## 2023-01-18 NOTE — PROGRESS NOTES
Surgical Procedure: RIGHT AND LEFT CARPAL TUNNEL RELEASE  Anesthesia: [facility]   Date: Monday, January 23, 2023   Time: 12:30 pm   Place: [facility]   Surgeon: Tera Meza  Medications reviewed with the patient / holding no medications for this procedure  Pre Op Instructions reviewed with the patient. Informed patient: A hospital nurse will contact her with instructions for the day of surgery. The patient expresses understanding and is in agreement with the plan. Post Op Appointment:  Wednesday, February 8 th at 8:40 am

## 2023-01-18 NOTE — PROGRESS NOTES
[facility] PRE-ADMISSION TESTING INSTRUCTIONS    The Preadmission Testing patient is instructed accordingly using the following criteria (check applicable):    ARRIVAL INSTRUCTIONS:  [x] Parking the day of Surgery is located in the Main Entrance lot. Upon entering the door, make an immediate right to the surgery reception desk    [x] Bring photo ID and insurance card    [] Bring in a copy of Living will or Durable Power of  papers. [x] Please be sure to arrange for responsible adult to provide transportation to and from the hospital    [x] Please arrange for responsible adult to be with you for the 24 hour period post procedure due to having anesthesia    [x] If you awake am of surgery not feeling well or have temperature >100 please call [phone]    GENERAL INSTRUCTIONS:    [x] Nothing by mouth after midnight, including gum, candy, mints or water    [x] You may brush your teeth, but do not swallow any water    [] Take medications as instructed with 1-2 oz of water    [] Stop herbal supplements and vitamins 5 days prior to procedure    [] Follow preop dosing of blood thinners per physician instructions    [] Take 1/2 dose of evening insulin, but no insulin after midnight    [] No oral diabetic medications after midnight    [] If diabetic and have low blood sugar or feel symptomatic, take 1-2oz apple juice only    [] Bring inhalers day of surgery    [] Bring C-PAP/ Bi-Pap day of surgery    [x] Bring urine specimen day of surgery    [x] Shower or bath with soap, lather and rinse well, AM of Surgery, no lotion, powders or creams to surgical site    [x] Follow bowel prep as instructed per surgeon    [x] No tobacco products within 24 hours of surgery     [x] No alcohol or illegal drug use within 24 hours of surgery.     [x] Jewelry, body piercing's, eyeglasses, contact lenses and dentures are not permitted into surgery (bring cases)      [x] Please do not wear any nail polish, make up or hair products on the day of surgery    [x] You can expect a call the business day prior to procedure to notify you if your arrival time changes    [x] If you receive a survey after surgery we would greatly appreciate your comments    [] Parent/guardian of a minor must accompany their child and remain on the premises  the entire time they are under our care     [] Pediatric patients may bring favorite toy, blanket or comfort item with them    [] A caregiver or family member must remain with the patient during their stay if they are mentally handicapped, have dementia, disoriented or unable to use a call light or would be a safety concern if left unattended    [x] Please notify surgeon if you develop any illness between now and time of surgery (cold, cough, sore throat, fever, nausea, vomiting) or any signs of infections  including skin, wounds, and dental.    [x]  The Outpatient Pharmacy is available to fill your prescription here on your day of surgery, ask your preop nurse for details    [] Other instructions    EDUCATIONAL MATERIALS PROVIDED:    [] PAT Preoperative Education Packet/Booklet     [] Medication List    [] Transfusion bracelet applied with instructions    [] Shower with soap, lather and rinse well, and use CHG wipes provided the evening before surgery as instructed    [] Incentive spirometer with instructions

## 2023-01-18 NOTE — PROGRESS NOTES
New Wrist/Hand Patient Visit     Referring Provider:   Issac [address]  [facility],  [facility]    CHIEF COMPLAINT:   Chief Complaint   Patient presents with    Hand Pain     Bl hand pain, denies injury, pain has been going on for years and its getting much worse, she was dx with carpal tunnel years ago, she had a EMG done in 2022 by Dr Isatu Carrasquillo        HPI:      Patti Rao is a 40y.o. year old female with severe bilateral carpal tunnel syndrome. This is been going on for a number of years. She recently had an EMG in December which demonstrate severe bilateral carpal tunnel syndrome. The pain wakes her up at night with numbness tingling pins-and-needles. She states she is dropping things. She describes her symptoms in her thumb index and middle finger on both hands. Denies any recent injuries. She has no other complaints or status.     PAST MEDICAL HISTORY  Past Medical History:   Diagnosis Date    Abnormal Pap smear     Anemia     Bradycardia     Cardiomyopathy ([address]     Hypertension     Migraine headache     miscarriage     multiple       PAST SURGICAL HISTORY  Past Surgical History:   Procedure Laterality Date     SECTION, LOW TRANSVERSE Bilateral 05    GASTRIC BYPASS SURGERY         FAMILY HISTORY   Family History   Problem Relation Age of Onset    High Blood Pressure Other     Diabetes Other     High Blood Pressure Mother     Heart Failure Mother     Cancer Mother     Diabetes Mother     High Blood Pressure Father     Cancer Father     Diabetes Father     Cancer Maternal Grandfather        SOCIAL HISTORY  Social History     Occupational History    Not on file   Tobacco Use    Smoking status: Former     Types: Cigars    Smokeless tobacco: Never    Tobacco comments:     smokes cigars   Substance and Sexual Activity    Alcohol use: No     Comment: socially    Drug use: No    Sexual activity: Yes     Partners: Male       CURRENT MEDICATIONS     Current Outpatient Medications:     losartan (COZAAR) 100 MG tablet, Take 1 tablet by mouth daily, Disp: 30 tablet, Rfl: 0    chlorthalidone (HYGROTEN) 50 MG tablet, Take daily (Patient not taking: Reported on 1/18/2023), Disp: 30 tablet, Rfl: 0    iron sucrose (VENOFER) 20 MG/ML injection, Infuse 100 mg intravenously every 3 months (Patient not taking: Reported on 1/18/2023), Disp: , Rfl:     ALLERGIES  Allergies   Allergen Reactions    Norvasc [Amlodipine]      Increased heart rate       Controlled Substances Monitoring:        REVIEW OF SYSTEMS:     Constitutional:  Negative for weight loss, fevers, chills, fatigue  Cardiovascular: Negative for chest pain, palpitations  Pulmonary: Negative for shortness of breath, labored breathing, cough  GI: negative for abdominal pain, nausea, vomitting   MSK: per HPI  Skin: negative for rash, open wounds    All other systems reviewed and are negative           PHYSICAL EXAM     Vitals:    01/18/23 0918   Weight: 146 lb (66.2 kg)   Height: 5' 2\" (1.575 m)       Height: 5' 2\" (1.575 m)  Weight: [unfilled]  BMI:  Body mass index is 26.7 kg/m². General: The patient is alert and oriented x 3, appears to be stated age and in no distress. HEENT: head is normocephalic, atraumatic. EOMI. Neck: supple, trachea midline, no thyromegaly   Cardiovascular: peripheral pulses palpable. Normal Capillary refill   Respiratory: breathing unlabored, chest expansion symmetric   Skin: no rash, no open wounds, no erythema  Psych: normal affect; mood stable  Neurologic: gait normal, sensation grossly intact in extremities  MSK:      Hand/Wrist:  Bilateral hand and wrist: She has a positive Tinel's at both wrists with symptoms reproduced into her thumb middle and index finger. She is 5 out of 5 strength with AIN PIN radial median ulnar nerve distribution.   She is positive Phalen's and positive compression Durkan's at the wrist.  Sensation is subjectively intact light touch radial ulnar median nerve distribution she has mild thenar atrophy bilaterally. Both hands are warm well perfused      IMAGING:     EMG results were reviewed independently by myself and discussed with the patient. She is severe bilateral carpal tunnel syndrome  Radiographic findings reviewed with patient    ASSESSMENT  Bilateral severe carpal tunnel syndrome      PLAN  -Pathology discussed needed with the patient. She is very interested in surgical management of this. This is bothering her and waking her up every night. We discussed pathology and treatment. I have recommended bilateral carpal tunnel release in the OR. Risk benefits alternatives surgery discussed in detail. She understands it there is risk to the tendons and nerves with surgery. We also discussed discussed how the numbness can take up to a year to resolve. Hopefully her nighttime symptoms resolve after surgery. We will get her on the schedule for Monday for outpatient bilateral carpal tunnel release. All of her questions were answered in detail. She will follow-up 2 weeks after surgery.           Faisal Mackenzie DO  Orthopaedic Surgery   1/18/23   9:38 AM EST

## 2023-01-19 ENCOUNTER — OFFICE VISIT (OUTPATIENT)
Dept: OBGYN | Age: 43
End: 2023-01-19
Payer: MEDICAID

## 2023-01-19 VITALS
HEART RATE: 60 BPM | BODY MASS INDEX: 25.95 KG/M2 | WEIGHT: 141 LBS | DIASTOLIC BLOOD PRESSURE: 92 MMHG | HEIGHT: 62 IN | SYSTOLIC BLOOD PRESSURE: 145 MMHG

## 2023-01-19 DIAGNOSIS — M54.50 ACUTE BILATERAL LOW BACK PAIN WITHOUT SCIATICA: ICD-10-CM

## 2023-01-19 DIAGNOSIS — Z87.42 HX OF ABNORMAL CERVICAL PAPANICOLAOU SMEAR: ICD-10-CM

## 2023-01-19 DIAGNOSIS — N92.1 MENORRHAGIA WITH IRREGULAR CYCLE: Primary | ICD-10-CM

## 2023-01-19 PROCEDURE — 1036F TOBACCO NON-USER: CPT | Performed by: OBSTETRICS & GYNECOLOGY

## 2023-01-19 PROCEDURE — G8484 FLU IMMUNIZE NO ADMIN: HCPCS | Performed by: OBSTETRICS & GYNECOLOGY

## 2023-01-19 PROCEDURE — 99203 OFFICE O/P NEW LOW 30 MIN: CPT | Performed by: OBSTETRICS & GYNECOLOGY

## 2023-01-19 PROCEDURE — G8419 CALC BMI OUT NRM PARAM NOF/U: HCPCS | Performed by: OBSTETRICS & GYNECOLOGY

## 2023-01-19 PROCEDURE — 3080F DIAST BP >= 90 MM HG: CPT | Performed by: OBSTETRICS & GYNECOLOGY

## 2023-01-19 PROCEDURE — G8427 DOCREV CUR MEDS BY ELIG CLIN: HCPCS | Performed by: OBSTETRICS & GYNECOLOGY

## 2023-01-19 PROCEDURE — 3077F SYST BP >= 140 MM HG: CPT | Performed by: OBSTETRICS & GYNECOLOGY

## 2023-01-19 NOTE — PROGRESS NOTES
This is a new patient to the [facility]. Has been followed at [facility] and has IUD which was placed by them for about 5 years. Had no menses with the IUD until Recently. [facility] I believe next week. Hx of hypertension which had been difficult to control from the chart notes. [facility] Follows with [facility] as well as her PCP. Two deliveries, both cesareans. Patient says her IUD is a Mirena. Apparently had heavy bleeding last week had to change pads hourly last week. May have had a conization sometime ago. Also notes some low back pain since bled one week ago. Had what sound like a myomectomy in [address] years ago. Also says she had an abnormal pap smear a year or 2 ago and had apparently a biopsy at [facility]. We have no records or pathology in her chart. Pelvic: Vulva:Normal   Vagina:Clear   Cx:Clear, Nabothian cyst at 7:00. String not visible. TPP with cotesting and cultures done   Ut:Mid, top normal size   Valdo:No masses or tenderness. IMP: Menorrhalgia, irregular menses, Hx abn pap. Plan: Pelvic sonogram and see back in 3 weeks for follow up.

## 2023-01-19 NOTE — PROGRESS NOTES
Patient alert and pleasant. Here today as a new patient with complaints of bleeding with an IUD. Assisted with pelvic exam, pap smear obtained, labeled and sent to lab. Discharge instructions have been discussed with the patient. Patient advised to call our office with any questions or concerns. Voiced understanding.

## 2023-01-20 LAB
HPV SAMPLE: NORMAL
SOURCE: NORMAL
SOURCE: NORMAL

## 2023-01-23 ENCOUNTER — ANESTHESIA (OUTPATIENT)
Dept: OPERATING ROOM | Age: 43
End: 2023-01-23
Payer: MEDICAID

## 2023-01-23 ENCOUNTER — ANESTHESIA EVENT (OUTPATIENT)
Dept: OPERATING ROOM | Age: 43
End: 2023-01-23
Payer: MEDICAID

## 2023-01-23 ENCOUNTER — HOSPITAL ENCOUNTER (OUTPATIENT)
Age: 43
Setting detail: OUTPATIENT SURGERY
Discharge: HOME | End: 2023-01-23
Attending: STUDENT IN AN ORGANIZED HEALTH CARE EDUCATION/TRAINING PROGRAM | Admitting: STUDENT IN AN ORGANIZED HEALTH CARE EDUCATION/TRAINING PROGRAM
Payer: MEDICAID

## 2023-01-23 VITALS
OXYGEN SATURATION: 95 % | HEART RATE: 86 BPM | HEIGHT: 62 IN | SYSTOLIC BLOOD PRESSURE: 155 MMHG | DIASTOLIC BLOOD PRESSURE: 86 MMHG | RESPIRATION RATE: 18 BRPM | WEIGHT: 143 LBS | TEMPERATURE: 98.5 F | BODY MASS INDEX: 26.31 KG/M2

## 2023-01-23 DIAGNOSIS — G56.03 BILATERAL CARPAL TUNNEL SYNDROME: Primary | ICD-10-CM

## 2023-01-23 LAB
HCG, URINE, POC: NEGATIVE
Lab: NORMAL
NEGATIVE QC PASS/FAIL: NORMAL
POSITIVE QC PASS/FAIL: NORMAL

## 2023-01-23 PROCEDURE — 2500000003 HC RX 250 WO HCPCS: Performed by: STUDENT IN AN ORGANIZED HEALTH CARE EDUCATION/TRAINING PROGRAM

## 2023-01-23 PROCEDURE — 6360000002 HC RX W HCPCS: Performed by: NURSE ANESTHETIST, CERTIFIED REGISTERED

## 2023-01-23 PROCEDURE — 2580000003 HC RX 258: Performed by: STUDENT IN AN ORGANIZED HEALTH CARE EDUCATION/TRAINING PROGRAM

## 2023-01-23 PROCEDURE — 6360000002 HC RX W HCPCS: Performed by: STUDENT IN AN ORGANIZED HEALTH CARE EDUCATION/TRAINING PROGRAM

## 2023-01-23 PROCEDURE — 3600000002 HC SURGERY LEVEL 2 BASE: Performed by: STUDENT IN AN ORGANIZED HEALTH CARE EDUCATION/TRAINING PROGRAM

## 2023-01-23 PROCEDURE — 3700000000 HC ANESTHESIA ATTENDED CARE: Performed by: STUDENT IN AN ORGANIZED HEALTH CARE EDUCATION/TRAINING PROGRAM

## 2023-01-23 PROCEDURE — 2709999900 HC NON-CHARGEABLE SUPPLY: Performed by: STUDENT IN AN ORGANIZED HEALTH CARE EDUCATION/TRAINING PROGRAM

## 2023-01-23 PROCEDURE — 3700000001 HC ADD 15 MINUTES (ANESTHESIA): Performed by: STUDENT IN AN ORGANIZED HEALTH CARE EDUCATION/TRAINING PROGRAM

## 2023-01-23 PROCEDURE — 2580000003 HC RX 258: Performed by: NURSE ANESTHETIST, CERTIFIED REGISTERED

## 2023-01-23 PROCEDURE — 7100000010 HC PHASE II RECOVERY - FIRST 15 MIN: Performed by: STUDENT IN AN ORGANIZED HEALTH CARE EDUCATION/TRAINING PROGRAM

## 2023-01-23 PROCEDURE — 7100000011 HC PHASE II RECOVERY - ADDTL 15 MIN: Performed by: STUDENT IN AN ORGANIZED HEALTH CARE EDUCATION/TRAINING PROGRAM

## 2023-01-23 PROCEDURE — 2500000003 HC RX 250 WO HCPCS: Performed by: NURSE ANESTHETIST, CERTIFIED REGISTERED

## 2023-01-23 PROCEDURE — 6370000000 HC RX 637 (ALT 250 FOR IP): Performed by: ANESTHESIOLOGY

## 2023-01-23 PROCEDURE — 3600000012 HC SURGERY LEVEL 2 ADDTL 15MIN: Performed by: STUDENT IN AN ORGANIZED HEALTH CARE EDUCATION/TRAINING PROGRAM

## 2023-01-23 RX ORDER — HYDROCODONE BITARTRATE AND ACETAMINOPHEN 5; 325 MG/1; MG/1
1 TABLET ORAL ONCE
Status: COMPLETED | OUTPATIENT
Start: 2023-01-23 | End: 2023-01-23

## 2023-01-23 RX ORDER — SODIUM CHLORIDE 9 MG/ML
INJECTION, SOLUTION INTRAVENOUS CONTINUOUS
Status: DISCONTINUED | OUTPATIENT
Start: 2023-01-23 | End: 2023-01-23 | Stop reason: HOSPADM

## 2023-01-23 RX ORDER — DEXAMETHASONE SODIUM PHOSPHATE 4 MG/ML
INJECTION, SOLUTION INTRA-ARTICULAR; INTRALESIONAL; INTRAMUSCULAR; INTRAVENOUS; SOFT TISSUE PRN
Status: DISCONTINUED | OUTPATIENT
Start: 2023-01-23 | End: 2023-01-23 | Stop reason: SDUPTHER

## 2023-01-23 RX ORDER — TRAMADOL HYDROCHLORIDE 50 MG/1
100 TABLET ORAL PRN
Status: CANCELLED | OUTPATIENT
Start: 2023-01-23 | End: 2023-01-23

## 2023-01-23 RX ORDER — LIDOCAINE HYDROCHLORIDE AND EPINEPHRINE 10; 10 MG/ML; UG/ML
INJECTION, SOLUTION INFILTRATION; PERINEURAL PRN
Status: DISCONTINUED | OUTPATIENT
Start: 2023-01-23 | End: 2023-01-23 | Stop reason: ALTCHOICE

## 2023-01-23 RX ORDER — ONDANSETRON 2 MG/ML
INJECTION INTRAMUSCULAR; INTRAVENOUS PRN
Status: DISCONTINUED | OUTPATIENT
Start: 2023-01-23 | End: 2023-01-23 | Stop reason: SDUPTHER

## 2023-01-23 RX ORDER — SODIUM CHLORIDE 0.9 % (FLUSH) 0.9 %
5-40 SYRINGE (ML) INJECTION PRN
Status: CANCELLED | OUTPATIENT
Start: 2023-01-23

## 2023-01-23 RX ORDER — SODIUM CHLORIDE 9 MG/ML
INJECTION, SOLUTION INTRAVENOUS CONTINUOUS PRN
Status: DISCONTINUED | OUTPATIENT
Start: 2023-01-23 | End: 2023-01-23 | Stop reason: SDUPTHER

## 2023-01-23 RX ORDER — MIDAZOLAM HYDROCHLORIDE 1 MG/ML
INJECTION INTRAMUSCULAR; INTRAVENOUS PRN
Status: DISCONTINUED | OUTPATIENT
Start: 2023-01-23 | End: 2023-01-23 | Stop reason: SDUPTHER

## 2023-01-23 RX ORDER — OXYCODONE HYDROCHLORIDE 5 MG/1
5 TABLET ORAL ONCE
Status: COMPLETED | OUTPATIENT
Start: 2023-01-23 | End: 2023-01-23

## 2023-01-23 RX ORDER — FENTANYL CITRATE 50 UG/ML
INJECTION, SOLUTION INTRAMUSCULAR; INTRAVENOUS PRN
Status: DISCONTINUED | OUTPATIENT
Start: 2023-01-23 | End: 2023-01-23 | Stop reason: SDUPTHER

## 2023-01-23 RX ORDER — TRAMADOL HYDROCHLORIDE 50 MG/1
50 TABLET ORAL PRN
Status: CANCELLED | OUTPATIENT
Start: 2023-01-23 | End: 2023-01-23

## 2023-01-23 RX ORDER — KETOROLAC TROMETHAMINE 30 MG/ML
30 INJECTION, SOLUTION INTRAMUSCULAR; INTRAVENOUS ONCE
Status: COMPLETED | OUTPATIENT
Start: 2023-01-23 | End: 2023-01-23

## 2023-01-23 RX ORDER — HYDROCODONE BITARTRATE AND ACETAMINOPHEN 5; 325 MG/1; MG/1
1 TABLET ORAL EVERY 4 HOURS PRN
Qty: 18 TABLET | Refills: 0 | Status: SHIPPED | OUTPATIENT
Start: 2023-01-23 | End: 2023-01-26

## 2023-01-23 RX ORDER — SODIUM CHLORIDE 9 MG/ML
INJECTION, SOLUTION INTRAVENOUS PRN
Status: CANCELLED | OUTPATIENT
Start: 2023-01-23

## 2023-01-23 RX ORDER — SODIUM CHLORIDE 9 MG/ML
INJECTION, SOLUTION INTRAVENOUS PRN
Status: DISCONTINUED | OUTPATIENT
Start: 2023-01-23 | End: 2023-01-23 | Stop reason: HOSPADM

## 2023-01-23 RX ORDER — PROPOFOL 10 MG/ML
INJECTION, EMULSION INTRAVENOUS CONTINUOUS PRN
Status: DISCONTINUED | OUTPATIENT
Start: 2023-01-23 | End: 2023-01-23 | Stop reason: SDUPTHER

## 2023-01-23 RX ORDER — SODIUM CHLORIDE 0.9 % (FLUSH) 0.9 %
5-40 SYRINGE (ML) INJECTION PRN
Status: DISCONTINUED | OUTPATIENT
Start: 2023-01-23 | End: 2023-01-23 | Stop reason: HOSPADM

## 2023-01-23 RX ORDER — SODIUM CHLORIDE 0.9 % (FLUSH) 0.9 %
5-40 SYRINGE (ML) INJECTION EVERY 12 HOURS SCHEDULED
Status: CANCELLED | OUTPATIENT
Start: 2023-01-23

## 2023-01-23 RX ORDER — SODIUM CHLORIDE 0.9 % (FLUSH) 0.9 %
5-40 SYRINGE (ML) INJECTION EVERY 12 HOURS SCHEDULED
Status: DISCONTINUED | OUTPATIENT
Start: 2023-01-23 | End: 2023-01-23 | Stop reason: HOSPADM

## 2023-01-23 RX ORDER — LIDOCAINE HYDROCHLORIDE 20 MG/ML
INJECTION, SOLUTION EPIDURAL; INFILTRATION; INTRACAUDAL; PERINEURAL PRN
Status: DISCONTINUED | OUTPATIENT
Start: 2023-01-23 | End: 2023-01-23 | Stop reason: SDUPTHER

## 2023-01-23 RX ADMIN — ONDANSETRON 4 MG: 2 INJECTION INTRAMUSCULAR; INTRAVENOUS at 12:35

## 2023-01-23 RX ADMIN — PROPOFOL 200 MCG/KG/MIN: 10 INJECTION, EMULSION INTRAVENOUS at 12:28

## 2023-01-23 RX ADMIN — SODIUM CHLORIDE: 9 INJECTION, SOLUTION INTRAVENOUS at 11:33

## 2023-01-23 RX ADMIN — OXYCODONE 5 MG: 5 TABLET ORAL at 14:27

## 2023-01-23 RX ADMIN — FENTANYL CITRATE 50 MCG: 50 INJECTION, SOLUTION INTRAMUSCULAR; INTRAVENOUS at 12:26

## 2023-01-23 RX ADMIN — MIDAZOLAM 2 MG: 1 INJECTION INTRAMUSCULAR; INTRAVENOUS at 12:19

## 2023-01-23 RX ADMIN — LIDOCAINE HYDROCHLORIDE 100 MG: 20 INJECTION, SOLUTION EPIDURAL; INFILTRATION; INTRACAUDAL; PERINEURAL at 12:28

## 2023-01-23 RX ADMIN — DEXAMETHASONE SODIUM PHOSPHATE 10 MG: 4 INJECTION, SOLUTION INTRAMUSCULAR; INTRAVENOUS at 12:35

## 2023-01-23 RX ADMIN — KETOROLAC TROMETHAMINE 30 MG: 30 INJECTION, SOLUTION INTRAMUSCULAR at 14:27

## 2023-01-23 RX ADMIN — WATER 2000 MG: 1 INJECTION INTRAMUSCULAR; INTRAVENOUS; SUBCUTANEOUS at 12:19

## 2023-01-23 RX ADMIN — FENTANYL CITRATE 50 MCG: 50 INJECTION, SOLUTION INTRAMUSCULAR; INTRAVENOUS at 12:28

## 2023-01-23 RX ADMIN — HYDROCODONE BITARTRATE AND ACETAMINOPHEN 1 TABLET: 5; 325 TABLET ORAL at 13:30

## 2023-01-23 ASSESSMENT — PAIN SCALES - GENERAL
PAINLEVEL_OUTOF10: 10
PAINLEVEL_OUTOF10: 6
PAINLEVEL_OUTOF10: 10
PAINLEVEL_OUTOF10: 8

## 2023-01-23 ASSESSMENT — PAIN DESCRIPTION - PAIN TYPE
TYPE: SURGICAL PAIN
TYPE: CHRONIC PAIN
TYPE: SURGICAL PAIN

## 2023-01-23 ASSESSMENT — PAIN DESCRIPTION - LOCATION
LOCATION: HAND
LOCATION: HAND;WRIST
LOCATION: HAND
LOCATION: HAND;WRIST
LOCATION: WRIST
LOCATION: HAND;WRIST

## 2023-01-23 ASSESSMENT — PAIN DESCRIPTION - ORIENTATION
ORIENTATION: RIGHT;LEFT
ORIENTATION: LEFT

## 2023-01-23 ASSESSMENT — PAIN DESCRIPTION - DESCRIPTORS
DESCRIPTORS: NUMBNESS;ACHING
DESCRIPTORS: THROBBING

## 2023-01-23 ASSESSMENT — PAIN DESCRIPTION - FREQUENCY
FREQUENCY: CONTINUOUS
FREQUENCY: INTERMITTENT
FREQUENCY: CONTINUOUS

## 2023-01-23 ASSESSMENT — PAIN DESCRIPTION - ONSET: ONSET: ON-GOING

## 2023-01-23 NOTE — H&P
Department of Orthopedic Surgery  Attending H&P Note          HISTORY OF PRESENT ILLNESS:                The patient is a 40 y.o. female who presents with with severe bilateral carpal tunnel syndrome. This is been going on for a number of years. She recently had an EMG in December which demonstrate severe bilateral carpal tunnel syndrome. The pain wakes her up at night with numbness tingling pins-and-needles. She states she is dropping things. She describes her symptoms in her thumb index and middle finger on both hands. Denies any recent injuries. She has no other complaints or status. Past Medical History:        Diagnosis Date    Anemia     Bradycardia     Carpal tunnel syndrome, bilateral     History of blood transfusion     Hypertension        Past Surgical History:        Procedure Laterality Date     SECTION, LOW TRANSVERSE Bilateral 2005    GASTRIC BYPASS SURGERY         Current Medications:   Current Facility-Administered Medications: 0.9 % sodium chloride infusion, , IntraVENous, Continuous  sodium chloride flush 0.9 % injection 5-40 mL, 5-40 mL, IntraVENous, 2 times per day  sodium chloride flush 0.9 % injection 5-40 mL, 5-40 mL, IntraVENous, PRN  0.9 % sodium chloride infusion, , IntraVENous, PRN  ceFAZolin (ANCEF) 2,000 mg in sterile water 20 mL IV syringe, 2,000 mg, IntraVENous, On Call to OR    Allergies:  Norvasc [amlodipine]    Social History:   TOBACCO:   reports that she has quit smoking. Her smoking use included cigars. She has never used smokeless tobacco.  ETOH:   reports current alcohol use. DRUGS:   reports no history of drug use.     Family History:       Problem Relation Age of Onset    High Blood Pressure Other     Diabetes Other     High Blood Pressure Mother     Heart Failure Mother     Cancer Mother     Diabetes Mother     High Blood Pressure Father     Cancer Father     Diabetes Father     Cancer Maternal Grandfather        REVIEW OF SYSTEMS: Constitutional:  Negative for weight loss, fevers, chills, fatigue  Cardiovascular: Negative for chest pain, palpitations  Pulmonary: Negative for shortness of breath, labored breathing, cough  GI: negative for abdominal pain, nausea, vomitting   MSK: per HPI  Skin: negative for rash, open wounds    All other systems reviewed and are negative         PHYSICAL EXAM:      Vitals:    01/18/23 1221 01/23/23 1109   BP:  (!) 186/106   Pulse:  75   Resp:  15   Temp:  97.5 °F (36.4 °C)   Weight: 143 lb (64.9 kg)    Height: 5' 2\" (1.575 m)        Height: [unfilled]  Weight: [unfilled]  BMI:  Body mass index is 26.16 kg/m². General: The patient is alert and oriented x 3, appears to be stated age and in no distress. HEENT: head is normocephalic, atraumatic. EOMI. Neck: supple, trachea midline, no thyromegaly   Cardiovascular: peripheral pulses palpable. Normal Capillary refill   Respiratory: breathing unlabored, chest expansion symmetric   GI: abdomen NT/ND. No masses   Skin: no rash, no open wounds, no erythema  Psych: normal affect; mood stable  MSK:  Bilateral hand and wrist: She has a positive Tinel's at both wrists with symptoms reproduced into her thumb middle and index finger. She is 5 out of 5 strength with AIN PIN radial median ulnar nerve distribution. She is positive Phalen's and positive compression Durkan's at the wrist.  Sensation is subjectively intact light touch radial ulnar median nerve distribution she has mild thenar atrophy bilaterally.   Both hands are warm well perfused        DATA:      CBC:   Lab Results   Component Value Date/Time    WBC 6.0 11/18/2022 12:00 PM    RBC 3.64 11/18/2022 12:00 PM    HGB 10.8 11/18/2022 12:00 PM    HCT 32.8 11/18/2022 12:00 PM    MCV 90.1 11/18/2022 12:00 PM    MCH 29.7 11/18/2022 12:00 PM    MCHC 32.9 11/18/2022 12:00 PM    RDW 15.6 11/18/2022 12:00 PM     11/18/2022 12:00 PM    MPV 12.3 11/18/2022 12:00 PM     BMP:    Lab Results   Component Value Date/Time     11/18/2022 12:00 PM    K 4.1 11/18/2022 12:00 PM     11/18/2022 12:00 PM    CO2 22 11/18/2022 12:00 PM    BUN 11 11/18/2022 12:00 PM    LABALBU 4.3 11/18/2022 12:00 PM    CREATININE 0.9 11/18/2022 12:00 PM    CALCIUM 9.8 11/18/2022 12:00 PM    GFRAA >60 09/14/2020 09:22 AM    LABGLOM >60 11/18/2022 12:00 PM    GLUCOSE 86 11/18/2022 12:00 PM     PT/INR:    Lab Results   Component Value Date/Time    PROTIME 10.3 03/31/2017 11:23 PM    INR 0.9 03/31/2017 11:23 PM     PTT:    Lab Results   Component Value Date/Time    APTT 29.4 03/06/2017 02:04 PM   [APTT}  U/A:    Lab Results   Component Value Date/Time    COLORU Yellow 07/28/2017 11:15 PM    PHUR 6.0 07/28/2017 11:15 PM    WBCUA 1-3 02/05/2015 05:49 PM    RBCUA NONE 02/05/2015 05:49 PM    BACTERIA RARE 02/05/2015 05:49 PM    CLARITYU Clear 07/28/2017 11:15 PM    SPECGRAV 1.010 07/28/2017 11:15 PM    LEUKOCYTESUR Negative 07/28/2017 11:15 PM    UROBILINOGEN 0.2 07/28/2017 11:15 PM    BILIRUBINUR Negative 07/28/2017 11:15 PM    BLOODU Negative 07/28/2017 11:15 PM    GLUCOSEU Negative 07/28/2017 11:15 PM           IMPRESSION/RECOMMENDATIONS:    40y.o. year old female with bilateral carpal tunnel syndrome  -Here today for outpatient bilateral carpal tunnel release. Risk-benefit alternatives of surgery discussed in detail with the patient. We specifically discussed damage to surrounding neurovascular structures, recurrent numbness. Incomplete release, infection, blood loss, failure to improve symptoms amongst others. She wishes to proceed. My initials were placed on both of her wrist.  Informed consent was obtained and signed and placed in the chart. All of her questions were answered in detail.     Daisy Davis DO   Orthopaedic Surgery   1/23/23  11:20 AM

## 2023-01-23 NOTE — DISCHARGE INSTRUCTIONS
[address]  Department of Orthopedic Surgery  [address] [place] [address]    Dr. Leslie Nguyen, DO    Orthopaedics Discharge Instructions   Weight bearing Status -okay to use bilateral hands for light activity. No heavy lifting pushing or pulling  Pain medication Per Prescriptions  Contact Office for Medication Refill- [phone]  Office can refill pain med every 7 days  If patient discharging to facility then pain control will be continued per facility physician  Ice to operative/injured site for 15-30 minutes of each hour for next 5 days    Recommend that you continue to ice the area 2-3 times per day after this   Elevate operative/injured limb on 2 pillows at home  Goal is to have limb above the heart if able  Continue DVT Prophylaxis (blood clot prevention) as Prescribed: None  Wound care -keep postoperative dressing in place clean and dry for 2 days. After 48 hours you can remove your dressing and shower over top of your incisions. Please replace clean dry bandages after showering    Follow Up in Office in 2 weeks. Call the office at [phone] for directions or with any questions. Watch for these significant complications. Call physician if they or any other problems occur:  Fever over 101°, redness, swelling or warmth at the operative site  Unrelieved nausea    Foul smelling or cloudy drainage at the operative site   Unrelieved pain    Blood soaked dressing. (Some oozing may be normal)     Numb, pale, blue, cold or tingling extremity    Future Appointments   Date Time Provider Danuta Hugo   1/26/2023 12:30 PM Kurt Hashimoto, DO [facility]   2/8/2023  8:40 AM Camille Pérez DO [facility]   2/9/2023  8:45 AM Saira Melchor MD [facility]         It is the Department of Orthopaedic Trauma's standard of practice that providers will de-escalate(wean) all patients from narcotic(opioid) medications during the post-operative period.    We provide multimodal pain control but opioid medications are tapered in all of our patients. If patient requires referral to pain management for prolonged taper off of opioid pain medication we will facilitate this process.

## 2023-01-23 NOTE — BRIEF OP NOTE
Brief Postoperative Note      Patient: Candido Runner  YOB: 1980  MRN: [id number]    Date of Procedure: 1/23/2023    Pre-Op Diagnosis: Bilateral carpal tunnel syndrome [G56.03]    Post-Op Diagnosis: Same       Procedure(s):  BILATERAL CARPAL TUNNEL RELEASE    Surgeon(s):  Camille Pérez DO    Assistant:  Resident: Mario Eng DO; Duc Solano DO; John Kimbrough DO    Anesthesia: Monitor Anesthesia Care    Estimated Blood Loss (mL): Minimal    Complications: None    Specimens:   * No specimens in log *    Implants:  * No implants in log *      Drains: * No LDAs found *    Findings: Bilateral carpal tunnel release    Electronically signed by Leslie Nguyen DO on 1/23/2023 at 12:54 PM

## 2023-01-23 NOTE — OP NOTE
Operative Note      Patient: Ta Carvalho  YOB: 1980  MRN: [id number]    Date of Procedure: 1/23/2023    Pre-Op Diagnosis: Bilateral carpal tunnel syndrome [G56.03]    Post-Op Diagnosis: Same       Procedure(s):  BILATERAL CARPAL TUNNEL RELEASE    Surgeon(s):  Richy Zazueta DO    Assistant:   Resident: Reagan Billingsley DO; Davina Gottlieb DO; Alexis Masters DO    Anesthesia: Monitor Anesthesia Care    Estimated Blood Loss (mL): Minimal    Complications: None    Specimens:   * No specimens in log *    Implants:  * No implants in log *      Drains: * No LDAs found *    Findings: See operative report below    Detailed Description of Procedure:   Thomas Elias is a 43-year-old female with severe bilateral carpal tunnel syndrome. She seen and examined the office risk-benefit alternative surgery discussed in detail. She understands risk of blood loss blood clot infection damage to surrounding neurovascular structures incomplete release perioperative symptoms amongst others. Informed consent was obtained on the day of surgery and placed in the chart. My initials were placed on both of her wrist.  She was over the operative room and monitored anesthesia care was induced. She was placed supine on the OR table and both arms were placed on arm table. Nonsterile tourniquet was placed high on the right upper arm. There is an IV in the left AC fossa. Preoperative biotics were infused. Under aseptic technique 5 cc of local anesthetic were injected in line with our incision on the left and right palm. Both hands were then prepped and draped in standard sterile orthopedic fashion. Appropriate timeout was confirmed confirming bilateral carpal tunnel release. An Esmarch tourniquet was placed to mid forearm on the left arm. Incision was created centered in line with the fourth ray with the distal extent Of the cardinal line. Dissection was carried down through the skin and palmar fascia was identified. Self-retaining retractor was placed and dissected down with 15 blade scalpel of the transverse carpal ligament. Transverse carpal ligament was released in line to the median nerve was identified. Careful dissection was carried distally complete release was achieved distally noting fat at the vascular arch. Retractors were placed proximally and dissection was carried through the transverse carpal ligament proximally. Scissors were used to release the remaining forearm fascia and proximal transverse carpal ligament. Nerve median nerve was identified and found to be free of lesions. It was hourglass shaped and flat. Esmarch tourniquet was released. Hemostasis was noted to be meticulous. The wound was copiously irrigated sterile saline and closed using 2-0 Monocryl and 3-0 nylon. Soft bulky dressing including Xeroform, 4 x 4's, web roll, and Ace bandage is placed on left wrist.    Attention was then turned to the right wrist.  Esmarch was used to exsanguinate the right arm and tourniquet was inflated 250 mmHg. Another incision was created in line with the fourth ray with the distal extent of Salcedo's cardinal line. Dissection was carried down through the palmar fascia and deep retractors were placed. Transverse carpal ligament was identified and incised in line with our incision. Dissection was carried down the median nerve. Careful distal dissection was performed until complete release was achieved at the fat pad indicating complete release of the vascular arch. Dissection was carried proximally and retractors were placed. The remainder of the transverse carpal ligament forearm fascia was released proximally using a 15 blade scalpel and Littler scissors. Median nerve was again examined found to be free of masses or lesions. It was flat, and hourglass appearance. This completed the release on the right side. Tourniquet was released hemostasis noted to be meticulous.   Foot was irrigated with sterile saline and closed in layered fashion using 2-0 Monocryl and 3-0 nylon. Another soft bulky dressing was placed including Xeroform, 4 x 4's, web roll, and Ace bandage. Patient was awake from anesthesia and transferred back in stable condition. She will discharge home with appropriate follow-up instructions and postoperative pain control. She will follow-up in the office in 2 weeks.     Electronically signed by Luann Estrella DO on 1/23/2023 at 12:54 PM (4) walks frequently

## 2023-01-23 NOTE — ANESTHESIA PRE PROCEDURE
Department of Anesthesiology  Preprocedure Note       Name:  Ta Carvalho   Age:  40 y.o.  :  1980                                          MRN:  [id number]         Date:  2023      Surgeon: Marion Santa):  Richy Zazueta DO    Procedure: Procedure(s):  BILATERAL CARPAL TUNNEL RELEASE    Medications prior to admission:   Prior to Admission medications    Medication Sig Start Date End Date Taking? Authorizing Provider   losartan (COZAAR) 100 MG tablet Take 1 tablet by mouth daily 22   Jacinta Acevedo DO   iron sucrose (VENOFER) 20 MG/ML injection Infuse 100 mg intravenously every 3 months    Historical Provider, MD       Current medications:    No current facility-administered medications for this encounter. 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) I70.1    Hypertension I10    Bilateral carpal tunnel syndrome G56.03    Vitamin D deficiency E55.9    Menstrual irregularity N92.6       Past Medical History:        Diagnosis Date    Anemia     Bradycardia     Carpal tunnel syndrome, bilateral     History of blood transfusion     Hypertension        Past Surgical History:        Procedure Laterality Date     SECTION, LOW TRANSVERSE Bilateral 2005    GASTRIC BYPASS SURGERY         Social History:    Social History     Tobacco Use    Smoking status: Former     Types: Cigars    Smokeless tobacco: Never    Tobacco comments:     smokes cigars   Substance Use Topics    Alcohol use: Yes     Comment: socially                                Counseling given: Not Answered  Tobacco comments: smokes cigars      Vital Signs (Current):   Vitals:    01/18/23 1221   Weight: 143 lb (64.9 kg)   Height: 5' 2\" (1.575 m)                                              BP Readings from Last 3 Encounters:   01/19/23 (!) 145/92   12/15/22 (!) 144/90   11/18/22 (!) 146/106       NPO Status:                                                                                 BMI:   Wt Readings from Last 3 Encounters:   01/18/23 143 lb (64.9 kg)   01/19/23 141 lb (64 kg)   01/18/23 146 lb (66.2 kg)     Body mass index is 26.16 kg/m². CBC:   Lab Results   Component Value Date/Time    WBC 6.0 11/18/2022 12:00 PM    RBC 3.64 11/18/2022 12:00 PM    HGB 10.8 11/18/2022 12:00 PM    HCT 32.8 11/18/2022 12:00 PM    MCV 90.1 11/18/2022 12:00 PM    RDW 15.6 11/18/2022 12:00 PM     11/18/2022 12:00 PM       CMP:   Lab Results   Component Value Date/Time     11/18/2022 12:00 PM    K 4.1 11/18/2022 12:00 PM     11/18/2022 12:00 PM    CO2 22 11/18/2022 12:00 PM    BUN 11 11/18/2022 12:00 PM    CREATININE 0.9 11/18/2022 12:00 PM    GFRAA >60 09/14/2020 09:22 AM    LABGLOM >60 11/18/2022 12:00 PM    GLUCOSE 86 11/18/2022 12:00 PM    PROT 6.7 11/18/2022 12:00 PM    CALCIUM 9.8 11/18/2022 12:00 PM    BILITOT 0.5 11/18/2022 12:00 PM    ALKPHOS 53 11/18/2022 12:00 PM    AST 20 11/18/2022 12:00 PM    ALT 11 11/18/2022 12:00 PM       POC Tests: No results for input(s): POCGLU, POCNA, POCK, POCCL, POCBUN, POCHEMO, POCHCT in the last 72 hours.     Coags:   Lab Results   Component Value Date/Time    PROTIME 10.3 03/31/2017 11:23 PM    INR 0.9 03/31/2017 11:23 PM    APTT 29.4 03/06/2017 02:04 PM       HCG (If Applicable):   Lab Results   Component Value Date    PREGTESTUR negative 07/28/2017        ABGs: No results found for: PHART, PO2ART, UXK1SLE, LMI0UAS, BEART, M2MVXPXQ     Type & Screen (If Applicable):  No results found for: Orpha Memory    Drug/Infectious Status (If Applicable):  No results found for: HIV, HEPCAB    COVID-19 Screening (If Applicable): No results found for: COVID19        Anesthesia Evaluation  Patient summary reviewed no history of anesthetic complications:   Airway: Mallampati: I  TM distance: >3 FB   Neck ROM: full     Dental:    (+) poor dentition      Pulmonary: breath sounds clear to auscultation  (+) sleep apnea:                            ROS comment: Former smoker   Cardiovascular:    (+) hypertension:,         Rhythm: regular  Rate: normal           Beta Blocker:  Not on Beta Blocker         Neuro/Psych:   (+) neuromuscular disease (Carpal tunnel syndrome, bilateral):, headaches: migraine headaches,             GI/Hepatic/Renal: Neg GI/Hepatic/Renal ROS            Endo/Other:    (+) blood dyscrasia: anemia:., .                 Abdominal:             Vascular: negative vascular ROS. Other Findings:           Anesthesia Plan      MAC     ASA 3       Induction: intravenous. Anesthetic plan and risks discussed with patient. Plan discussed with CRNA. Mable Hdz MD   1/23/2023      Chart reviewed, patient seen. Agree with above assessment.     RENA Catherine - CRNA    1/23/23

## 2023-01-23 NOTE — PROGRESS NOTES
26 Dr Sandra Laguerre called for hypertension and pt c/o of 10/10 pain after receiving pain med, orders given   425 Dr Cuello Sessions called for pt c/o pain in left hand orders given  6955 Dr Cuello Sessions called with pt update no further orders at this time.  Pt may have ibuprofen  along with pain meds

## 2023-01-23 NOTE — PROGRESS NOTES
Assisted pt to restroom to void. Crying in pain. States 10/ 10 throbbing in left hand/wrist despite pain meds.

## 2023-01-23 NOTE — ANESTHESIA POSTPROCEDURE EVALUATION
Department of Anesthesiology  Postprocedure Note    Patient: Rose Maddox  MRN: [id number]  YOB: 1980  Date of evaluation: 1/23/2023      Procedure Summary     Date: 01/23/23 Room / Location: [facility] OR 04 / [facility]    Anesthesia Start: 1219 Anesthesia Stop: 8113    Procedure: BILATERAL CARPAL TUNNEL RELEASE (Bilateral: Wrist) Diagnosis:       Bilateral carpal tunnel syndrome      (Bilateral carpal tunnel syndrome [G56.03])    Surgeons: Danyelle Dunbar DO Responsible Provider: Susy Harding MD    Anesthesia Type: MAC ASA Status: 3          Anesthesia Type: MAC    Ana Phase I: Ana Score: 10    Ana Phase II:        Anesthesia Post Evaluation    Patient location during evaluation: PACU  Patient participation: complete - patient participated  Level of consciousness: awake and alert  Pain score: 4  Airway patency: patent  Nausea & Vomiting: no vomiting and no nausea  Complications: no  Cardiovascular status: hemodynamically stable  Respiratory status: acceptable and spontaneous ventilation  Hydration status: stable

## 2023-01-23 NOTE — PROGRESS NOTES
CLINICAL PHARMACY NOTE: MEDS TO BEDS    Total # of Prescriptions Filled: 1   The following medications were delivered to the patient:  Wilber Gale 5    Additional Documentation:

## 2023-02-08 ENCOUNTER — OFFICE VISIT (OUTPATIENT)
Dept: ORTHOPEDIC SURGERY | Age: 43
End: 2023-02-08

## 2023-02-08 VITALS — WEIGHT: 141 LBS | HEIGHT: 62 IN | BODY MASS INDEX: 25.95 KG/M2

## 2023-02-08 DIAGNOSIS — G56.03 BILATERAL CARPAL TUNNEL SYNDROME: Primary | ICD-10-CM

## 2023-02-08 PROCEDURE — 99024 POSTOP FOLLOW-UP VISIT: CPT | Performed by: STUDENT IN AN ORGANIZED HEALTH CARE EDUCATION/TRAINING PROGRAM

## 2023-02-08 RX ORDER — CHLORTHALIDONE 25 MG/1
TABLET ORAL
COMMUNITY
Start: 2023-01-19

## 2023-02-08 RX ORDER — HYDROCODONE BITARTRATE AND ACETAMINOPHEN 5; 325 MG/1; MG/1
1 TABLET ORAL EVERY 4 HOURS PRN
Qty: 18 TABLET | Refills: 0 | Status: SHIPPED | OUTPATIENT
Start: 2023-02-08 | End: 2023-02-11

## 2023-02-08 RX ORDER — GABAPENTIN 300 MG/1
300 CAPSULE ORAL 2 TIMES DAILY
Qty: 60 CAPSULE | Refills: 0 | Status: SHIPPED | OUTPATIENT
Start: 2023-02-08 | End: 2023-03-10

## 2023-02-08 NOTE — PROGRESS NOTES
Follow Up Post Operative Visit     Surgery: Bilateral carpal tunnel release  Date: 1/23/2023    Subjective:    Saray Pierson is here for follow up visit s/p above procedure. She has had no issues with her right hand she is doing well. Her nighttime symptoms have resolved. She is having some severe nerve type pain in her left hand specifically in her thenar eminence. She complains of some numbness around her incision extending into her thenar aspect of her palm. Denies fever and chills. Controlled Substances Monitoring:        Physical Exam:    Height: 5' 2\" (1.575 m), Weight: 141 lb (64 kg)    General: Alert and oriented x3, no acute distress  Cardiovascular/pulmonary: No labored breathing, peripheral perfusion intact  Musculoskeletal:    Bilateral hands: Incisions well approximated sutures removed. No signs of infection. She can make full fist and extend her fingers fully. She has full range of motion wrist flexion extension radial ulnar deviation pronation supination. On the left she has a numb patch in the distribution of the palmar cutaneous nerve on the palmar surface of her thenar eminence. This is exquisitely tender to touch. Imaging: No new imaging    Assessment and Plan: Bilateral carpal tunnel release, 2 weeks postop  -We had a long discussion today about possible damage to the palmar continuous nerve. At this point we are going to allow this to to heal with time and conservative treatment. I will give her some pain medicine along with gabapentin for nerve type symptoms. She did understand the risks of damage to aberrant branch of the palmar cutaneous nerve. We will see if this continues to heal with time. I am also going to refer to occupational therapy for some pulm desensitization. She can keep her incisions covered for the next few days otherwise she can start using her hands as tolerated. We will see her back in 1 month to see how she is doing.     Eboni Sosa, DO Orthopaedic Surgery   2/8/23   8:57 AM EST

## 2023-02-15 ENCOUNTER — TELEPHONE (OUTPATIENT)
Dept: PHYSICAL THERAPY | Age: 43
End: 2023-02-15

## 2023-02-20 ENCOUNTER — EVALUATION (OUTPATIENT)
Dept: OCCUPATIONAL THERAPY | Age: 43
End: 2023-02-20
Payer: MEDICAID

## 2023-02-20 DIAGNOSIS — G56.03 BILATERAL CARPAL TUNNEL SYNDROME: Primary | ICD-10-CM

## 2023-02-20 PROCEDURE — 97165 OT EVAL LOW COMPLEX 30 MIN: CPT | Performed by: OCCUPATIONAL THERAPIST

## 2023-02-20 NOTE — PROGRESS NOTES
OCCUPATIONAL THERAPY INITIAL EVALUATION    [address]  [address] [place] [address]  Dept: [phone]  Loc: [phone]   [facility] OT Fax: [phone]    Date:  2023  Initial Evaluation Date: 2/15/23   Evaluating Therapist: Bj Fuentes OT    Patient Name:  Thee Andino    :  1980    Restrictions/Precautions:  none noted, low fall risk  Diagnosis:  G56.03 (ICD-10-CM) - Bilateral carpal tunnel syndrome       Date of Surgery/Injury: 23 sx (4 weeks post op)    Insurance/Certification information:  [employer] of care signed (Y/N): N  Visit# / total visits:     Referring Practitioner:  Melissa Taylor DO   Specific Practitioner Orders: eval and treat, palm desensitization    Assessment of current deficits   [] Functional mobility  [x] ADLs  [x] Strength   [] Cognition   [] Functional transfers   [x] IADLs  [] Safety Awareness  [x] Endurance   [x] Fine Motor Coordination  [] Balance  [] Vision/perception  [] Sensation    [] Gross Motor Coordination [x] ROM  [x] Pain   [] Edema    [] Scar Adhesion/Skin Integrity     OT PLAN OF CARE   OT POC based on physician orders, patient diagnosis and results of clinical assessment    Frequency/Duration  1-2 / week for 12 visits.   Specific OT Treatment to include:     [x] Instruction in HEP                   Modalities:  [x] Therapeutic Exercise        [x] Ultrasound               [] Electrical Stimulation/Attended  [x] PROM/Stretching                    [x] Fluidotherapy          [x]  Paraffin                   [x] AAROM  [x] AROM                 [x] Iontophoresis:   [x] Tendon Glides                                               [] Neuromuscular Re-Ed            [x] ADL/IADL re-training    [x] Therapeutic Activity       [x] Pain Management with/without modalities PRN                 [x] Manual Therapy                      [x] Splinting                      [x] Scar Management                   []Joint Protection/Training  []Ergonomics                             [] Joint Mobilization        [] Adaptive Equipment Assessment/Training                             [x] Manual Edema Mobilization   [] Myofascial Release                 [] Energy Conservation/Work Simplification  [x] GM/FM Coordination       [] Safety retraining/education per  individual diagnosis/goals  [x] Desensitization       Patient Specific Goal: to decrease pain and be able to use her dominant hand                             GOALS (Long term same as Short term):  1) Patient will demonstrate good understanding of home program (exercises/activities/diagnosis/prognosis/goals) with good accuracy.   2) Patient will demonstrate increased active/passive range of motion of their L wrist to [facility] for ADL/IADL completion.  3) Patient will demonstrate increased /pinch strength of at least 10 / 5 pinch pounds of their B hands in order to increase ability to unload groceries at home.   4) Patient to report decreased pain in their affected B upper extremity from 9/10 to 4/10 or less with resistive functional use.   5) Increase in fine motor function as evidenced by decreased time to complete 9-hole peg test and/or MRMT test by at least 5-10 seconds to be able to clasp her bra with B hands.   6) Patient to demonstrate decreased guarding of their affected extremity from 100% to 20% or less.   7) Patient will demonstrate a non-tender/non-adherent scar.   8) Patient will decrease QuickDASH score to 25% or less for increased participation in daily functional activities.       Past Medical History:   Past Medical History:   Diagnosis Date    Anemia     Bradycardia     Carpal tunnel syndrome, bilateral     History of blood transfusion     Hypertension      Past Surgical History:   Past Surgical History:   Procedure Laterality Date    CARPAL TUNNEL RELEASE Bilateral 1/23/2023    BILATERAL CARPAL TUNNEL RELEASE performed  by Olivia Valle DO at [address] [place] [address], LOW TRANSVERSE Bilateral 06/30/2005    GASTRIC BYPASS SURGERY         Reason for Referral: Pt is a 40year old female who presents this date for formal occupational therapy evaluation. Pt underwent sx on 1/23/23 for bilateral carpal tunnel release-she has been suffering with symptoms since 2017. Pt presents this date with main complaint of sensation changes in the palm. 1/23/23 Procedure(s):  BILATERAL CARPAL TUNNEL RELEASE      Home Living: lives with 2 kids and her mother  Prior Level of Function: independent     Cognition:   Alert/Oriented x3     IADL STATUS:   Ind Mod I Min A Mod A Max A Dep Other   Homemaking Responsibility    x      Shopping Responsibility:    x      Mode of Transportation:  x        Leisure & Hobbies:  x        Work:       x     Comments:overall patient is relying heavily on her kids and mother for assistance around the home. Pt is completing tasks that she can around the home with her non-dominant hand and increased time. Pt is not working at this time. ADL STATUS:   Ind Mod I Min A Mod A Max A Dep Other   Feeding:  x        Grooming:  x        Bathing:  x        UE Dressing:  x        LE Dressing:  x        Toileting:  x        Transfers:  x          Comments: All ADL tasks can be completed with her non-dominant hand.      Pain Level: current 5/10 pain, increased to 8/10 with ROM    UE Assessment:  Upper Extremity ROM      LUE RUE     WRIST Flexion 0-80* 40 85      Extension 0-70* 10 60      Radial Deviation 0-20* 15 10      Ulnar Deviation 0-30* 30 45       Hand ROM   AROM []         PROM[]       THUMB Radial Abduction 53-71* 60 60      Palmar Abduction 50-71* 55 70      Opposition full full      Composite Fist full full       Comment: Hand Dominance is left    Sensation: no numbness or tingling noted    Edema Description/Circumferential Measurements:   No swelling, no intermittent swelling    Strength not assessed at this time due to pain level , will assess when appropriate. 9 Hole Peg test IE: 2/20/23    Left 40.86 sec    Right 24.35 sec       QuickDash IE: 2/20/23     77.3%       Intervention: Issued tubigrip to be worn on the L hand to decrease pain. Pt instructed to remove brace at meal times and attempt to use LUE majority of meal time. Eval Complexity: low  Profile and History- Chart reviewed  Assessment of Occupational Performance and Identification of Deficits- 7   Clinical Decision Making- no additional modifications required    Rehab Potential:                                 [x] Good  [] Fair  [] Poor        Suggested Professional Referral:       [x] No  [] Yes:  Barriers to Goal Achievement[de-identified]          [x] No  [] Yes:  Domestic Concerns:                           [x] No  [] Yes:       Patient. Education:  [x] Plans/Goals, Risks/Benefits discussed  [x] Home exercise program  Method of Education: [x] Verbal  [x] Demo  [x] Written  Comprehension of Education:  [x] Verbalizes understanding. [x] Demonstrates understanding. []Needs Review. [] Demonstrates/verbalizes understanding of HEP/Ed previously given. Patient understands diagnosis/prognosis and consents to treatment, plan and goals: [x] Yes    [] No     Goal Formulation: Patient  Time In: 10:15a            Time Out: 11:00a                      Timed Code Treatment Minutes: 0 minutes      CODE  Minutes  Units   23434 OT Eval Low 45 1   34296 OT Eval Medium     19576 OT Eval High     81596 Fluidotherapy     31253 Manual     08822 Therapeutic Ex     63420 Therapeutic Activity     25672 ADL/COMP Tech Train     05727 Neuromuscular Re-Ed     26407 OrthoManagementTraining     78835 Paraffin     77906 Electrical Stim - Attended     [id number] Iontophoresis     87395 Ultrasound      Other                Electronically signed by: Robert Power [place] #[id number]     [id number] Certification / Comments      Frequency/Duration 1-2 / week for 12 visits.    Certification period From: 2/20/23  To: 5/20/23     I have reviewed the Plan of Care established for skilled therapy services and certify that the services are required and that they will be provided while the patient is under my care. Physician's Comments/Revisions:                   Physicians's Printed Name:  Kofi Felix DO                                   [de-identified] Signature:                                                               Date:      Please review Patient's OT evaluation and if you agree sign/date and fax back to us at our [address] [facility] OT Fax: [phone].  Thank you for your referral!

## 2023-02-24 ENCOUNTER — TREATMENT (OUTPATIENT)
Dept: OCCUPATIONAL THERAPY | Age: 43
End: 2023-02-24

## 2023-02-24 DIAGNOSIS — G56.03 BILATERAL CARPAL TUNNEL SYNDROME: Primary | ICD-10-CM

## 2023-02-24 NOTE — PROGRESS NOTES
OCCUPATIONAL THERAPY PROGRESS NOTE    Date:  2023  Initial Evaluation Date: 23      Patient Name:  Teresa Kelley    :  1980    Restrictions/Precautions:  none noted, low fall risk  Diagnosis:  G56.03 (ICD-10-CM) - Bilateral carpal tunnel syndrome                                                            Date of Surgery/Injury: 23 sx (4 weeks post op)     Insurance/Certification information:  [employer] of care signed (Y/N): N  Visit# / total visits:      Referring Practitioner:  Oscar Sung DO   Specific Practitioner Orders: eval and treat, palm desensitization     Assessment of current deficits   [] Functional mobility             [x] ADLs           [x] Strength                  [] Cognition   [] Functional transfers           [x] IADLs          [] Safety Awareness  [x] Endurance   [x] Fine Motor Coordination    [] Balance      [] Vision/perception    [] Sensation     [] Gross Motor Coordination [x] ROM           [x] Pain                        [] Edema          [] Scar Adhesion/Skin Integrity      OT PLAN OF CARE   OT POC based on physician orders, patient diagnosis and results of clinical assessment     Frequency/Duration  1-2 / week for 12 visits.   Specific OT Treatment to include:      [x] Instruction in HEP                   Modalities:  [x] Therapeutic Exercise                 [x] Ultrasound               [] Electrical Stimulation/Attended  [x] PROM/Stretching                    [x] Fluidotherapy          [x]  Paraffin                   [x] AAROM  [x] AROM                 [x] Iontophoresis:   [x] Tendon Glides                                               [] Neuromuscular Re-Ed            [x] ADL/IADL re-training    [x] Therapeutic Activity                  [x] Pain Management with/without modalities PRN                 [x] Manual Therapy                      [x] Splinting                                   [x] Scar Management                   []Joint Protection/Training  []Ergonomics                             [] Joint Mobilization                      [] Adaptive Equipment Assessment/Training                             [x] Manual Edema Mobilization   [] Myofascial Release                 [] Energy Conservation/Work Simplification  [x] GM/FM Coordination                [] Safety retraining/education per  individual diagnosis/goals  [x] Desensitization        Patient Specific Goal: to decrease pain and be able to use her dominant hand                             GOALS (Long term same as Short term):  1) Patient will demonstrate good understanding of home program (exercises/activities/diagnosis/prognosis/goals) with good accuracy. 2) Patient will demonstrate increased active/passive range of motion of their L wrist to [facility] for ADL/IADL completion. 3) Patient will demonstrate increased /pinch strength of at least 10 / 5 pinch pounds of their B hands in order to increase ability to unload groceries at home. 4) Patient to report decreased pain in their affected B upper extremity from 9/10 to 4/10 or less with resistive functional use. 5) Increase in fine motor function as evidenced by decreased time to complete 9-hole peg test and/or MRMT test by at least 5-10 seconds to be able to clasp her bra with B hands. 6) Patient to demonstrate decreased guarding of their affected extremity from 100% to 20% or less. 7) Patient will demonstrate a non-tender/non-adherent scar. 8) Patient will decrease QuickDASH score to 25% or less for increased participation in daily functional activities. Pain Level: severe nerve pain along thumb and scar with motion and palpation    Subjective: \"Please just cut it off this pain is killing me\"     Objective:  Updated POC to be completed by 10th or 3/20/23.     INTERVENTION: COMPLETED: SPECIFICS/COMMENTS:   Modality:     MHP x -5 mins to increase soft tissue mobility and decrease pain        AROM:     B wrists X  X  x -flexion/extension  -supination/pronation  -radial/ulnar deviation   B hands X    x -opposition from thumb to alternating fingers place marbles in container   -towel scrunches 5 reps, walking towel into palm full flexion and extension or digits   AAROM:               PROM/Stretching:               Scar Mass/Edema Control:     Scar massage x -along the carpal tunnel scars to decrease tightness and scar tissue formation        Strengthening:               Other:     HEP x -reviewed HEP previously issued   desensitization x -pom pom, sponge press and bunchem rubbing for 1 min at a time-breaks required during minute     Assessment/Comments: Pt is making Fair progress toward stated plan of care. Pt presents this date with increased pain at the L hand. Frequent breaks required to perform all exercises this date. Sharp shooting pains along the thumb and scar of the L hand with motion and palpation. R hand tolerated exercises better but increased pain with palpation and massage along the scar. Will increase as tolerated. -Rehab Potential: Good  -Requires OT Follow Up: Yes    Time In: 10:00            Time Out: 10:45p             Visit #: 2    Treatment Charges: Mins Units   Modalities     Ther Exercise 30 2   Manual Therapy 15 1   Thera Activities     ADL/Home Mgt      Neuro Re-education     Gait Training     Group Therapy     Non-Billable Service Time     Other     Total Time/Units 45 3       -Response to Treatment: increased pain with all exercises this date, pt discouraged with lack of progress and pain  Goals: Goals for pt can be see on initial eval     Plan:   [x]  Continues Plan of care: Treatment covered based on POC and graduated to patient's progress. Pt education continues at each visit to obtain maximum benefits from skilled OT intervention. []  Alter Plan of care:   []  Discharge:       [facility], OTR/L #[id number]

## 2023-03-07 ENCOUNTER — TREATMENT (OUTPATIENT)
Dept: OCCUPATIONAL THERAPY | Age: 43
End: 2023-03-07
Payer: MEDICAID

## 2023-03-07 DIAGNOSIS — G56.03 BILATERAL CARPAL TUNNEL SYNDROME: Primary | ICD-10-CM

## 2023-03-07 PROCEDURE — 97110 THERAPEUTIC EXERCISES: CPT | Performed by: OCCUPATIONAL THERAPIST

## 2023-03-07 PROCEDURE — 97140 MANUAL THERAPY 1/> REGIONS: CPT | Performed by: OCCUPATIONAL THERAPIST

## 2023-03-07 NOTE — PROGRESS NOTES
OCCUPATIONAL THERAPY PROGRESS NOTE    Date:  3/7/2023  Initial Evaluation Date: 23      Patient Name:  Sara Goel    :  1980    Restrictions/Precautions:  none noted, low fall risk  Diagnosis:  G56.03 (ICD-10-CM) - Bilateral carpal tunnel syndrome                                                            Date of Surgery/Injury: 23 sx (6  weeks post op)     Insurance/Certification information:  [employer] of care signed (Y/N): N  Visit# / total visits: 3 / 12     Referring Practitioner:  Annabell Severe DO   Specific Practitioner Orders: eval and treat, palm desensitization     Assessment of current deficits   [] Functional mobility             [x] ADLs           [x] Strength                  [] Cognition   [] Functional transfers           [x] IADLs          [] Safety Awareness  [x] Endurance   [x] Fine Motor Coordination    [] Balance      [] Vision/perception    [] Sensation     [] Gross Motor Coordination [x] ROM           [x] Pain                        [] Edema          [] Scar Adhesion/Skin Integrity      OT PLAN OF CARE   OT POC based on physician orders, patient diagnosis and results of clinical assessment     Frequency/Duration  1-2 / week for 12 visits.   Specific OT Treatment to include:      [x] Instruction in HEP                   Modalities:  [x] Therapeutic Exercise                 [x] Ultrasound               [] Electrical Stimulation/Attended  [x] PROM/Stretching                    [x] Fluidotherapy          [x]  Paraffin                   [x] AAROM  [x] AROM                 [x] Iontophoresis:   [x] Tendon Glides                                               [] Neuromuscular Re-Ed            [x] ADL/IADL re-training    [x] Therapeutic Activity                  [x] Pain Management with/without modalities PRN                 [x] Manual Therapy                      [x] Splinting                                   [x] Scar Management                   []Joint Protection/Training  []Ergonomics                             [] Joint Mobilization                      [] Adaptive Equipment Assessment/Training                             [x] Manual Edema Mobilization   [] Myofascial Release                 [] Energy Conservation/Work Simplification  [x] GM/FM Coordination                [] Safety retraining/education per  individual diagnosis/goals  [x] Desensitization        Patient Specific Goal: to decrease pain and be able to use her dominant hand                             GOALS (Long term same as Short term):  1) Patient will demonstrate good understanding of home program (exercises/activities/diagnosis/prognosis/goals) with good accuracy. 2) Patient will demonstrate increased active/passive range of motion of their L wrist to [facility] for ADL/IADL completion. 3) Patient will demonstrate increased /pinch strength of at least 10 / 5 pinch pounds of their B hands in order to increase ability to unload groceries at home. 4) Patient to report decreased pain in their affected B upper extremity from 9/10 to 4/10 or less with resistive functional use. 5) Increase in fine motor function as evidenced by decreased time to complete 9-hole peg test and/or MRMT test by at least 5-10 seconds to be able to clasp her bra with B hands. 6) Patient to demonstrate decreased guarding of their affected extremity from 100% to 20% or less. 7) Patient will demonstrate a non-tender/non-adherent scar. 8) Patient will decrease QuickDASH score to 25% or less for increased participation in daily functional activities. Pain Level: severe nerve pain along thumb and scar with motion and palpation. 8-9/10 in L thenar area -pillar area . R has no pain - just sore from over use. Subjective:  Pt states \"My thumb is still killing me in this L hand - I get sharp stabbing pains all the time.   I'm going to try to see the Dr earlier!!!!!\"     Objective:  Updated POC to be completed by 10th or 3/20/23. INTERVENTION: COMPLETED: SPECIFICS/COMMENTS:   Modality:     MHP  -5 mins to increase soft tissue mobility and decrease pain   Paraffin dip  x - done bilaterally with MHP   AROM:     B wrists X     -flexion/extension  -supination/pronation  -radial/ulnar deviation   B hands      -opposition from thumb to alternating fingers place marbles in container   -towel scrunches 5 reps, walking towel into palm full flexion and extension or digits   AAROM:               PROM/Stretching:               Scar Mass/Edema Control:     Scar massage X        x -R hand only. along the carpal tunnel scars to decrease tightness and scar tissue formation. Thenar / hypothenar stretch. - L hand - palm press onto warm paraffin- 3 rep;s - to stretch / distract thenar/ hypothenar area   Edema control x - tubigrip D sleeve to wear alone or over kinesiotape   Strengthening:               Other:     Median nerve  X    x - R hand doing all positions - 5 reps added to HEP   - L hand doing  first 4 position not wrist rotation  3 rep's added to HEP   HEP x -reviewed HEP previously issued   Wrist prefab brace x - can wear as needed - try not to wear ALL day - therapist added padding in the splint distally to ^ comfort in the thenar/ hypothenar area. Kinesiotaping x -to decrease pain and edema in thenar area - to wear if tolerated till next session - pt instructed how to take off if needed. One long Y tape around hypothenar stretch- 50% plus pull, to around thumb thenar and MP area  and I strip at CT scar area with no tension. Comfort cool thumb brace x - fitted with today to wear as needed to decrease pain and edema instead of wrist brace    desensitization x -pom pom, sponge press and bunchem rubbing for 1 min at a time-breaks required during minute       Assessment/Comments: Pt is making Fair progress toward stated plan of care. Pt presents this date with continued nerve pain / pilar pain irritation in her L hand.   Pt cried frequently during therapy and felt guilty - 'being a whimp'. Therapist assured her she was NOT a whimp and nerve pain is very painful. Entire session spent on decreasing pt's pain. PT to try at home to decrease pain:  + heat - can do 15 min every hours if needed   + kinesiotaping done this date- to try to leave on with tubigrtip size D sleeve but can take off is needed  +median nerve glides given   + try palm press on to her lap with L scar area - for slight thenar/ hypothenar distraction - 3 reps'. Pt was kinesiotaped  - see above - and then wore the tubigrip over the tape and the comfort cool. She can wear the wrist brace also but therapist does no want her to wear it all day 2* she is beginning to not use her hand at all ( came in with the hand in the protection - don't touch me - position). Therapist did to to the Dr's office to see it she can move her appointment up sooner but he ia on vacation. She does have an appointment next week-3/15/2023 . Will increase program as tolerated. -Rehab Potential: Good  -Requires OT Follow Up: Yes    Time In: 10:00  am          Time Out: 11:00 am             Visit #: 3    Treatment Charges: Mins Units   Modalities     Ther Exercise 45 3   Manual Therapy 15 1   Thera Activities     ADL/Home Mgt      Neuro Re-education     Gait Training     Group Therapy     Non-Billable Service Time     Other     Total Time/Units 60 4       -Response to Treatment: Pt very frustrated with her continued pain and nerve pain- therapist provided support throughout session . PT had slightly decreased pain by end of session. Goals: Goals for pt can be see on initial eval     Plan:   [x]  Continues Plan of care: Treatment covered based on POC and graduated to patient's progress. Pt education continues at each visit to obtain maximum benefits from skilled OT intervention.   []  Alter Plan of care:   []  Discharge:      Cesar Reeder OTR/KELVIN, CHT  # [phone]

## 2023-03-15 ENCOUNTER — OFFICE VISIT (OUTPATIENT)
Dept: ORTHOPEDIC SURGERY | Age: 43
End: 2023-03-15

## 2023-03-15 DIAGNOSIS — G56.03 BILATERAL CARPAL TUNNEL SYNDROME: Primary | ICD-10-CM

## 2023-03-15 PROCEDURE — 99024 POSTOP FOLLOW-UP VISIT: CPT | Performed by: STUDENT IN AN ORGANIZED HEALTH CARE EDUCATION/TRAINING PROGRAM

## 2023-03-15 NOTE — PROGRESS NOTES
Follow Up Post Operative Visit     Surgery: Bilateral carpal tunnel release  Date: 1/23/2023    Subjective:    Ezekiel Wolfe is here for follow up visit s/p above procedure. She has had no issues with her right hand she is doing well. Still having severe 10 out of 10 pain in her left palm. The numbness has resolved and she is progressing with therapy however she is just very frustrated that her hand and wrist hurts with all attempted motion. The gabapentin did not appear to help her symptoms at all. Controlled Substances Monitoring:        Physical Exam:    No data recorded    General: Alert and oriented x3, no acute distress  Cardiovascular/pulmonary: No labored breathing, peripheral perfusion intact  Musculoskeletal:    Right hand: Well approximated incision that is nontender. She has full active range of motion of all of her fingers and sensation is intact radial ulnar median nerve distribution. She has no hypersensitivity at all    Left hand and wrist: Severe hypersensitivity around her scar. She has a healthy appearing scar with no evidence of infection. She can make a full fist and extend her fingers actively with 5 out of 5 strength. FPL and EPL are intact. Sensation over her thenar eminence is intact as well although she has some paresthesias with palpation. She is severely hypersensitive around her scar extending proximally into her wrist.  Sensation to light touch is intact in the radial ulnar median nerve distribution. Her hand is warm well perfused. Imaging: No new imaging    Assessment and Plan: Bilateral carpal tunnel release, 6 weeks postop  -We had another long discussion today about her prognosis. Her right hand has recovered normally. Her left hand has developed severe pain which I think is related to scar tissue and pillar pain along with nerve hypersensitivity.   At this point it appears that her pulm cutaneous nerve is intact because sensation has returned into her thenar eminence. I am unsure of the etiology of her pain. I did explain that these nerves can get irritated from scar tissue after surgery or she could be developing complex regional pain syndrome. I offered her a steroid injection to sort of calm down her pain and inflammation which she declined. I think with time that this should continue to resolve however the patient is very frustrated with her hand which I understand. I am going to send her to a hand surgeon for a second opinion. Hopefully this surgeon can come up with a solution to her problem however I think this may just require time and scar tissue maturation. She is understands and is happy with this treatment plan. All of her questions were answered in detail. I still like to see her back after she sees the second opinion to see how she is doing. She can schedule an appointment in 6 weeks.     Jacinta Caldwell DO   Orthopaedic Surgery   3/15/23  9:38 AM

## 2023-07-17 DIAGNOSIS — G56.03 BILATERAL CARPAL TUNNEL SYNDROME: ICD-10-CM

## 2023-07-17 RX ORDER — GABAPENTIN 300 MG/1
300 CAPSULE ORAL 3 TIMES DAILY
Qty: 90 CAPSULE | Refills: 0 | Status: SHIPPED | OUTPATIENT
Start: 2023-07-17 | End: 2023-08-16

## 2023-07-19 ENCOUNTER — OFFICE VISIT (OUTPATIENT)
Dept: ORTHOPEDIC SURGERY | Age: 43
End: 2023-07-19

## 2023-07-19 DIAGNOSIS — M65.311 TRIGGER THUMB OF BOTH HANDS: ICD-10-CM

## 2023-07-19 DIAGNOSIS — G56.03 BILATERAL CARPAL TUNNEL SYNDROME: Primary | ICD-10-CM

## 2023-07-19 DIAGNOSIS — M65.312 TRIGGER THUMB OF BOTH HANDS: ICD-10-CM

## 2023-07-19 NOTE — PROGRESS NOTES
Follow Up Post Operative Visit     Surgery: Bilateral carpal tunnel release  Date: 1/23/2023    Subjective:    Sivakumar Gillette is here for follow up visit s/p above procedure. She has not been seen since March. At her last visit in March I referred her to hand surgery to follow-up for her left-sided pillar pain. She failed to see a hand surgeon. She is here today complaining of soreness in her palm still bilateral thumb pain. She healed up appropriately after carpal tunnels and her pain subsided. She was back to normal but she is developed triggering of both of her thumbs which is causing her significant pain and dysfunction. She still has mild scar hypersensitivity on the left    Controlled Substances Monitoring:        Physical Exam:    No data recorded    General: Alert and oriented x3, no acute distress  Cardiovascular/pulmonary: No labored breathing, peripheral perfusion intact  Musculoskeletal:        Bilateral hands: There are scars of healed appropriately. Sensation is intact grossly to radial ulnar median nerve distribution both hands. She can make a full fist actively extend her fingers. Sensation is intact the clonidine is pinching the nerve in both hands. She still has some scar hypersensitivity on the left but overall she is doing much better. She does have triggering of her bilateral thumbs along with positive CMC grind. She is tender palpation over the A1 pulley bilaterally. Imaging: Multiple views of bilateral hands demonstrate mild basal joint arthritis most pronounced on the right. There is joint space narrowing at the carpometacarpal joint. Remainder of the hand examination is normal.  These images were independently interpreted myself and discussed with the patient      Assessment and Plan:   -6-month status post bilateral carpal tunnel release  -Bilateral trigger thumbs    -Going to refer to hand surgery for bilateral trigger thumbs.   We discussed surgical management she

## 2023-09-28 ENCOUNTER — TELEPHONE (OUTPATIENT)
Dept: OCCUPATIONAL THERAPY | Age: 43
End: 2023-09-28

## 2023-09-29 ENCOUNTER — TELEPHONE (OUTPATIENT)
Dept: PHYSICAL THERAPY | Age: 43
End: 2023-09-29

## 2023-11-15 ENCOUNTER — HOSPITAL ENCOUNTER (OUTPATIENT)
Age: 43
Discharge: HOME | End: 2023-11-15
Payer: MEDICAID

## 2023-11-15 LAB
25(OH)D3 SERPL-MCNC: 15.6 NG/ML (ref 30–100)
ANION GAP SERPL CALCULATED.3IONS-SCNC: 14 MMOL/L (ref 7–16)
BASOPHILS # BLD: 0.03 K/UL (ref 0–0.2)
BASOPHILS NFR BLD: 1 % (ref 0–2)
BUN SERPL-MCNC: 10 MG/DL (ref 6–20)
CALCIUM SERPL-MCNC: 9 MG/DL (ref 8.6–10.2)
CHLORIDE SERPL-SCNC: 107 MMOL/L (ref 98–107)
CO2 SERPL-SCNC: 22 MMOL/L (ref 22–29)
CREAT SERPL-MCNC: 0.9 MG/DL (ref 0.5–1)
EOSINOPHIL # BLD: 0.01 K/UL (ref 0.05–0.5)
EOSINOPHILS RELATIVE PERCENT: 0 % (ref 0–6)
ERYTHROCYTE [DISTWIDTH] IN BLOOD BY AUTOMATED COUNT: 12.6 % (ref 11.5–15)
GFR SERPL CREATININE-BSD FRML MDRD: >60 ML/MIN/1.73M2
GLUCOSE SERPL-MCNC: 82 MG/DL (ref 74–99)
HCT VFR BLD AUTO: 31.3 % (ref 34–48)
HGB BLD-MCNC: 10.3 G/DL (ref 11.5–15.5)
IMM GRANULOCYTES # BLD AUTO: <0.03 K/UL (ref 0–0.58)
IMM GRANULOCYTES NFR BLD: 0 % (ref 0–5)
LYMPHOCYTES NFR BLD: 2.69 K/UL (ref 1.5–4)
LYMPHOCYTES RELATIVE PERCENT: 45 % (ref 20–42)
MCH RBC QN AUTO: 31.7 PG (ref 26–35)
MCHC RBC AUTO-ENTMCNC: 32.9 G/DL (ref 32–34.5)
MCV RBC AUTO: 96.3 FL (ref 80–99.9)
MONOCYTES NFR BLD: 0.32 K/UL (ref 0.1–0.95)
MONOCYTES NFR BLD: 5 % (ref 2–12)
NEUTROPHILS NFR BLD: 49 % (ref 43–80)
NEUTS SEG NFR BLD: 2.99 K/UL (ref 1.8–7.3)
PHOSPHATE SERPL-MCNC: 2.5 MG/DL (ref 2.5–4.5)
PLATELET # BLD AUTO: 292 K/UL (ref 130–450)
PMV BLD AUTO: 10.6 FL (ref 7–12)
POTASSIUM SERPL-SCNC: 3.7 MMOL/L (ref 3.5–5)
RBC # BLD AUTO: 3.25 M/UL (ref 3.5–5.5)
SODIUM SERPL-SCNC: 143 MMOL/L (ref 132–146)
WBC OTHER # BLD: 6.1 K/UL (ref 4.5–11.5)

## 2023-11-15 PROCEDURE — 85025 COMPLETE CBC W/AUTO DIFF WBC: CPT

## 2023-11-15 PROCEDURE — 80048 BASIC METABOLIC PNL TOTAL CA: CPT

## 2023-11-15 PROCEDURE — 36415 COLL VENOUS BLD VENIPUNCTURE: CPT

## 2023-11-15 PROCEDURE — 82306 VITAMIN D 25 HYDROXY: CPT

## 2023-11-15 PROCEDURE — 84100 ASSAY OF PHOSPHORUS: CPT

## 2023-12-23 ENCOUNTER — HOSPITAL ENCOUNTER (EMERGENCY)
Age: 43
Discharge: HOME | End: 2023-12-23
Attending: STUDENT IN AN ORGANIZED HEALTH CARE EDUCATION/TRAINING PROGRAM
Payer: MEDICAID

## 2023-12-23 VITALS
HEART RATE: 84 BPM | BODY MASS INDEX: 24.66 KG/M2 | RESPIRATION RATE: 14 BRPM | HEIGHT: 62 IN | SYSTOLIC BLOOD PRESSURE: 112 MMHG | DIASTOLIC BLOOD PRESSURE: 69 MMHG | TEMPERATURE: 97.9 F | OXYGEN SATURATION: 96 % | WEIGHT: 134 LBS

## 2023-12-23 DIAGNOSIS — E16.2 HYPOGLYCEMIA: ICD-10-CM

## 2023-12-23 DIAGNOSIS — E86.0 DEHYDRATION: Primary | ICD-10-CM

## 2023-12-23 DIAGNOSIS — E87.20 LACTIC ACIDOSIS: ICD-10-CM

## 2023-12-23 DIAGNOSIS — Z78.9 ALCOHOL USE: ICD-10-CM

## 2023-12-23 LAB
ALBUMIN SERPL-MCNC: 4.2 G/DL (ref 3.5–5.2)
ALP SERPL-CCNC: 59 U/L (ref 35–104)
ALT SERPL-CCNC: 14 U/L (ref 0–32)
ANION GAP SERPL CALCULATED.3IONS-SCNC: 25 MMOL/L (ref 7–16)
APAP SERPL-MCNC: <5 UG/ML (ref 10–30)
AST SERPL-CCNC: 36 U/L (ref 0–31)
BASOPHILS # BLD: 0.04 K/UL (ref 0–0.2)
BASOPHILS NFR BLD: 0 % (ref 0–2)
BILIRUB SERPL-MCNC: 0.5 MG/DL (ref 0–1.2)
BUN SERPL-MCNC: 20 MG/DL (ref 6–20)
CALCIUM SERPL-MCNC: 9.3 MG/DL (ref 8.6–10.2)
CHLORIDE SERPL-SCNC: 98 MMOL/L (ref 98–107)
CHP ED QC CHECK: YES
CHP ED QC CHECK: YES
CO2 SERPL-SCNC: 14 MMOL/L (ref 22–29)
CREAT SERPL-MCNC: 0.8 MG/DL (ref 0.5–1)
EOSINOPHIL # BLD: 0 K/UL (ref 0.05–0.5)
EOSINOPHILS RELATIVE PERCENT: 0 % (ref 0–6)
ERYTHROCYTE [DISTWIDTH] IN BLOOD BY AUTOMATED COUNT: 12.4 % (ref 11.5–15)
ETHANOLAMINE SERPL-MCNC: 112 MG/DL
GFR SERPL CREATININE-BSD FRML MDRD: >60 ML/MIN/1.73M2
GLUCOSE BLD-MCNC: 142 MG/DL (ref 74–99)
GLUCOSE BLD-MCNC: 58 MG/DL
GLUCOSE BLD-MCNC: 58 MG/DL (ref 74–99)
GLUCOSE BLD-MCNC: 72 MG/DL
GLUCOSE BLD-MCNC: 72 MG/DL (ref 74–99)
GLUCOSE SERPL-MCNC: 42 MG/DL (ref 74–99)
HCG SERPL QL: NEGATIVE
HCT VFR BLD AUTO: 35.7 % (ref 34–48)
HGB BLD-MCNC: 11.8 G/DL (ref 11.5–15.5)
IMM GRANULOCYTES # BLD AUTO: 0.03 K/UL (ref 0–0.58)
IMM GRANULOCYTES NFR BLD: 0 % (ref 0–5)
INFLUENZA A BY PCR: NOT DETECTED
INFLUENZA B BY PCR: NOT DETECTED
LACTATE BLDV-SCNC: 2.1 MMOL/L (ref 0.5–2.2)
LACTATE BLDV-SCNC: 5.4 MMOL/L (ref 0.5–2.2)
LYMPHOCYTES NFR BLD: 1.23 K/UL (ref 1.5–4)
LYMPHOCYTES RELATIVE PERCENT: 10 % (ref 20–42)
MAGNESIUM SERPL-MCNC: 1.9 MG/DL (ref 1.6–2.6)
MCH RBC QN AUTO: 31.9 PG (ref 26–35)
MCHC RBC AUTO-ENTMCNC: 33.1 G/DL (ref 32–34.5)
MCV RBC AUTO: 96.5 FL (ref 80–99.9)
MONOCYTES NFR BLD: 0.16 K/UL (ref 0.1–0.95)
MONOCYTES NFR BLD: 1 % (ref 2–12)
NEUTROPHILS NFR BLD: 88 % (ref 43–80)
NEUTS SEG NFR BLD: 10.75 K/UL (ref 1.8–7.3)
PLATELET # BLD AUTO: 444 K/UL (ref 130–450)
PMV BLD AUTO: 10.2 FL (ref 7–12)
POTASSIUM SERPL-SCNC: 3.2 MMOL/L (ref 3.5–5)
PROT SERPL-MCNC: 7.4 G/DL (ref 6.4–8.3)
RBC # BLD AUTO: 3.7 M/UL (ref 3.5–5.5)
SALICYLATES SERPL-MCNC: <0.3 MG/DL (ref 0–30)
SARS-COV-2 RDRP RESP QL NAA+PROBE: NOT DETECTED
SODIUM SERPL-SCNC: 137 MMOL/L (ref 132–146)
SPECIMEN DESCRIPTION: NORMAL
TOXIC TRICYCLIC SC,BLOOD: NEGATIVE
WBC OTHER # BLD: 12.2 K/UL (ref 4.5–11.5)

## 2023-12-23 PROCEDURE — 96375 TX/PRO/DX INJ NEW DRUG ADDON: CPT

## 2023-12-23 PROCEDURE — 83605 ASSAY OF LACTIC ACID: CPT

## 2023-12-23 PROCEDURE — 82962 GLUCOSE BLOOD TEST: CPT

## 2023-12-23 PROCEDURE — G0480 DRUG TEST DEF 1-7 CLASSES: HCPCS

## 2023-12-23 PROCEDURE — 84703 CHORIONIC GONADOTROPIN ASSAY: CPT

## 2023-12-23 PROCEDURE — 2580000003 HC RX 258: Performed by: STUDENT IN AN ORGANIZED HEALTH CARE EDUCATION/TRAINING PROGRAM

## 2023-12-23 PROCEDURE — 87635 SARS-COV-2 COVID-19 AMP PRB: CPT

## 2023-12-23 PROCEDURE — 83735 ASSAY OF MAGNESIUM: CPT

## 2023-12-23 PROCEDURE — 80143 DRUG ASSAY ACETAMINOPHEN: CPT

## 2023-12-23 PROCEDURE — 6370000000 HC RX 637 (ALT 250 FOR IP): Performed by: STUDENT IN AN ORGANIZED HEALTH CARE EDUCATION/TRAINING PROGRAM

## 2023-12-23 PROCEDURE — 6360000002 HC RX W HCPCS: Performed by: STUDENT IN AN ORGANIZED HEALTH CARE EDUCATION/TRAINING PROGRAM

## 2023-12-23 PROCEDURE — 96374 THER/PROPH/DIAG INJ IV PUSH: CPT

## 2023-12-23 PROCEDURE — 80307 DRUG TEST PRSMV CHEM ANLYZR: CPT

## 2023-12-23 PROCEDURE — 99284 EMERGENCY DEPT VISIT MOD MDM: CPT

## 2023-12-23 PROCEDURE — 80053 COMPREHEN METABOLIC PANEL: CPT

## 2023-12-23 PROCEDURE — 87502 INFLUENZA DNA AMP PROBE: CPT

## 2023-12-23 PROCEDURE — 80179 DRUG ASSAY SALICYLATE: CPT

## 2023-12-23 PROCEDURE — 85025 COMPLETE CBC W/AUTO DIFF WBC: CPT

## 2023-12-23 PROCEDURE — 96361 HYDRATE IV INFUSION ADD-ON: CPT

## 2023-12-23 RX ORDER — KETOROLAC TROMETHAMINE 30 MG/ML
30 INJECTION, SOLUTION INTRAMUSCULAR; INTRAVENOUS ONCE
Status: COMPLETED | OUTPATIENT
Start: 2023-12-23 | End: 2023-12-23

## 2023-12-23 RX ORDER — 0.9 % SODIUM CHLORIDE 0.9 %
1000 INTRAVENOUS SOLUTION INTRAVENOUS ONCE
Status: COMPLETED | OUTPATIENT
Start: 2023-12-23 | End: 2023-12-23

## 2023-12-23 RX ORDER — ONDANSETRON 2 MG/ML
4 INJECTION INTRAMUSCULAR; INTRAVENOUS ONCE
Status: COMPLETED | OUTPATIENT
Start: 2023-12-23 | End: 2023-12-23

## 2023-12-23 RX ORDER — LANOLIN ALCOHOL/MO/W.PET/CERES
100 CREAM (GRAM) TOPICAL ONCE
Status: COMPLETED | OUTPATIENT
Start: 2023-12-23 | End: 2023-12-23

## 2023-12-23 RX ORDER — DEXTROSE MONOHYDRATE 25 G/50ML
25 INJECTION, SOLUTION INTRAVENOUS ONCE
Status: DISCONTINUED | OUTPATIENT
Start: 2023-12-23 | End: 2023-12-23 | Stop reason: CLARIF

## 2023-12-23 RX ADMIN — ONDANSETRON 4 MG: 2 INJECTION INTRAMUSCULAR; INTRAVENOUS at 11:48

## 2023-12-23 RX ADMIN — SODIUM CHLORIDE 1000 ML: 9 INJECTION, SOLUTION INTRAVENOUS at 13:08

## 2023-12-23 RX ADMIN — Medication 100 MG: at 13:42

## 2023-12-23 RX ADMIN — DEXTROSE MONOHYDRATE 250 ML: 100 INJECTION, SOLUTION INTRAVENOUS at 16:07

## 2023-12-23 RX ADMIN — POTASSIUM BICARBONATE 40 MEQ: 782 TABLET, EFFERVESCENT ORAL at 13:08

## 2023-12-23 RX ADMIN — SODIUM CHLORIDE 1000 ML: 9 INJECTION, SOLUTION INTRAVENOUS at 11:47

## 2023-12-23 RX ADMIN — KETOROLAC TROMETHAMINE 30 MG: 30 INJECTION, SOLUTION INTRAMUSCULAR; INTRAVENOUS at 15:48

## 2023-12-23 NOTE — ED NOTES
Department of Emergency Medicine  FIRST PROVIDER TRIAGE NOTE             Independent MLP           12/23/23  10:08 AM EST    Date of Encounter: 12/23/23   MRN: [id number]      HPI: Candy Valentine is a 44 y.o. female who presents to the ED for Fatigue and Nausea     UNABLE TO GET ANY INFORMATION OUT OF PATIENT. WILL NOT ANSWER QUESTIONS. INITIALLY CAME IN IN [facility]. WENT TO LAY ON COUCH AND WOULD NOT GET UP WHEN WE FIRST CALLED HER NAME. ROS: TAMRA. PE: Gen Appearance/Constitutional: alert  HEENT: NC/NT. PERRLA,  Airway patent. Initial Plan of Care: All treatment areas with department are currently occupied. Plan to order/Initiate the following while awaiting opening in ED.   Initiate Treatment-Testing, Proceed toTreatment Area When Bed Available for ED Attending/RAJAT to Continue Care    Electronically signed by RENA Marc CNP   DD: 12/23/23      RENA Marc CNP  12/23/23 1141

## 2023-12-26 ENCOUNTER — TELEPHONE (OUTPATIENT)
Dept: PRIMARY CARE CLINIC | Age: 43
End: 2023-12-26

## 2023-12-26 VITALS
DIASTOLIC BLOOD PRESSURE: 80 MMHG | RESPIRATION RATE: 14 BRPM | HEART RATE: 65 BPM | OXYGEN SATURATION: 100 % | TEMPERATURE: 99 F | SYSTOLIC BLOOD PRESSURE: 121 MMHG | WEIGHT: 134 LBS | BODY MASS INDEX: 24.66 KG/M2 | HEIGHT: 62 IN

## 2023-12-26 NOTE — TELEPHONE ENCOUNTER
Anette Decker was in the Ed on 12/23 all her labs are in the system. She isn't feeling much better that she was then, but didn't want to be admitted to the hospital because its just her for her kids. She didn't want to ruin their donnie. She doesn't want to go back to the ED and sit there for hours like she did before being she just had all the lab work done and its all in there. She stated they wanted to admit her and she thinks she still needs to be admitted and didn't know if you can have her directly admitted or not. Can you look at there chart and see what can be done for her? Thank you!

## 2023-12-27 ENCOUNTER — HOSPITAL ENCOUNTER (EMERGENCY)
Age: 43
Discharge: HOME | End: 2023-12-27
Attending: STUDENT IN AN ORGANIZED HEALTH CARE EDUCATION/TRAINING PROGRAM
Payer: MEDICAID

## 2023-12-27 ENCOUNTER — APPOINTMENT (OUTPATIENT)
Dept: GENERAL RADIOLOGY | Age: 43
End: 2023-12-27
Payer: MEDICAID

## 2023-12-27 DIAGNOSIS — R53.1 GENERALIZED WEAKNESS: ICD-10-CM

## 2023-12-27 DIAGNOSIS — R53.83 FATIGUE, UNSPECIFIED TYPE: Primary | ICD-10-CM

## 2023-12-27 DIAGNOSIS — E86.0 MILD DEHYDRATION: ICD-10-CM

## 2023-12-27 LAB
ALBUMIN SERPL-MCNC: 4.1 G/DL (ref 3.5–5.2)
ALP SERPL-CCNC: 44 U/L (ref 35–104)
ALT SERPL-CCNC: 9 U/L (ref 0–32)
ANION GAP SERPL CALCULATED.3IONS-SCNC: 10 MMOL/L (ref 7–16)
AST SERPL-CCNC: 15 U/L (ref 0–31)
BASOPHILS # BLD: 0.04 K/UL (ref 0–0.2)
BASOPHILS NFR BLD: 1 % (ref 0–2)
BILIRUB SERPL-MCNC: 0.7 MG/DL (ref 0–1.2)
BNP SERPL-MCNC: <36 PG/ML (ref 0–125)
BUN SERPL-MCNC: 12 MG/DL (ref 6–20)
CALCIUM SERPL-MCNC: 9.5 MG/DL (ref 8.6–10.2)
CHLORIDE SERPL-SCNC: 104 MMOL/L (ref 98–107)
CO2 SERPL-SCNC: 26 MMOL/L (ref 22–29)
CREAT SERPL-MCNC: 0.9 MG/DL (ref 0.5–1)
EKG ATRIAL RATE: 50 BPM
EKG P AXIS: 47 DEGREES
EKG P-R INTERVAL: 126 MS
EKG Q-T INTERVAL: 466 MS
EKG QRS DURATION: 86 MS
EKG QTC CALCULATION (BAZETT): 424 MS
EKG R AXIS: 37 DEGREES
EKG T AXIS: 12 DEGREES
EKG VENTRICULAR RATE: 50 BPM
EOSINOPHIL # BLD: 0.03 K/UL (ref 0.05–0.5)
EOSINOPHILS RELATIVE PERCENT: 1 % (ref 0–6)
ERYTHROCYTE [DISTWIDTH] IN BLOOD BY AUTOMATED COUNT: 12.9 % (ref 11.5–15)
GFR SERPL CREATININE-BSD FRML MDRD: >60 ML/MIN/1.73M2
GLUCOSE SERPL-MCNC: 88 MG/DL (ref 74–99)
HCT VFR BLD AUTO: 33.6 % (ref 34–48)
HGB BLD-MCNC: 10.8 G/DL (ref 11.5–15.5)
IMM GRANULOCYTES # BLD AUTO: <0.03 K/UL (ref 0–0.58)
IMM GRANULOCYTES NFR BLD: 0 % (ref 0–5)
LACTATE BLDV-SCNC: 0.7 MMOL/L (ref 0.5–2.2)
LIPASE SERPL-CCNC: 38 U/L (ref 13–60)
LYMPHOCYTES NFR BLD: 2.04 K/UL (ref 1.5–4)
LYMPHOCYTES RELATIVE PERCENT: 47 % (ref 20–42)
MAGNESIUM SERPL-MCNC: 1.9 MG/DL (ref 1.6–2.6)
MCH RBC QN AUTO: 31.9 PG (ref 26–35)
MCHC RBC AUTO-ENTMCNC: 32.1 G/DL (ref 32–34.5)
MCV RBC AUTO: 99.1 FL (ref 80–99.9)
MONOCYTES NFR BLD: 0.29 K/UL (ref 0.1–0.95)
MONOCYTES NFR BLD: 7 % (ref 2–12)
NEUTROPHILS NFR BLD: 45 % (ref 43–80)
NEUTS SEG NFR BLD: 1.93 K/UL (ref 1.8–7.3)
PLATELET # BLD AUTO: 330 K/UL (ref 130–450)
PMV BLD AUTO: 11.4 FL (ref 7–12)
POTASSIUM SERPL-SCNC: 3.4 MMOL/L (ref 3.5–5)
PROT SERPL-MCNC: 6.9 G/DL (ref 6.4–8.3)
RBC # BLD AUTO: 3.39 M/UL (ref 3.5–5.5)
SODIUM SERPL-SCNC: 140 MMOL/L (ref 132–146)
SPECIMEN SOURCE: NORMAL
STREP A, MOLECULAR: NEGATIVE
TROPONIN I SERPL HS-MCNC: <6 NG/L (ref 0–9)
WBC OTHER # BLD: 4.3 K/UL (ref 4.5–11.5)

## 2023-12-27 PROCEDURE — 71045 X-RAY EXAM CHEST 1 VIEW: CPT

## 2023-12-27 PROCEDURE — 87651 STREP A DNA AMP PROBE: CPT

## 2023-12-27 PROCEDURE — 96361 HYDRATE IV INFUSION ADD-ON: CPT

## 2023-12-27 PROCEDURE — 83690 ASSAY OF LIPASE: CPT

## 2023-12-27 PROCEDURE — 99285 EMERGENCY DEPT VISIT HI MDM: CPT

## 2023-12-27 PROCEDURE — 93010 ELECTROCARDIOGRAM REPORT: CPT | Performed by: INTERNAL MEDICINE

## 2023-12-27 PROCEDURE — 2580000003 HC RX 258: Performed by: STUDENT IN AN ORGANIZED HEALTH CARE EDUCATION/TRAINING PROGRAM

## 2023-12-27 PROCEDURE — 83605 ASSAY OF LACTIC ACID: CPT

## 2023-12-27 PROCEDURE — 96360 HYDRATION IV INFUSION INIT: CPT

## 2023-12-27 PROCEDURE — 93005 ELECTROCARDIOGRAM TRACING: CPT | Performed by: STUDENT IN AN ORGANIZED HEALTH CARE EDUCATION/TRAINING PROGRAM

## 2023-12-27 PROCEDURE — 84484 ASSAY OF TROPONIN QUANT: CPT

## 2023-12-27 PROCEDURE — 85025 COMPLETE CBC W/AUTO DIFF WBC: CPT

## 2023-12-27 PROCEDURE — 80053 COMPREHEN METABOLIC PANEL: CPT

## 2023-12-27 PROCEDURE — 83880 ASSAY OF NATRIURETIC PEPTIDE: CPT

## 2023-12-27 PROCEDURE — 83735 ASSAY OF MAGNESIUM: CPT

## 2023-12-27 RX ORDER — TIZANIDINE 2 MG/1
2 TABLET ORAL EVERY 8 HOURS PRN
Qty: 12 TABLET | Refills: 0 | Status: SHIPPED | OUTPATIENT
Start: 2023-12-27

## 2023-12-27 RX ORDER — 0.9 % SODIUM CHLORIDE 0.9 %
1000 INTRAVENOUS SOLUTION INTRAVENOUS ONCE
Status: COMPLETED | OUTPATIENT
Start: 2023-12-27 | End: 2023-12-27

## 2023-12-27 RX ADMIN — SODIUM CHLORIDE 1000 ML: 9 INJECTION, SOLUTION INTRAVENOUS at 05:57

## 2023-12-27 NOTE — ED PROVIDER NOTES
(including COVID-19) without concomitant bacterial infection      ED Course as of 12/27/23 2017   Wed Dec 27, 2023   0802 CXR shows no evidence of focal consolidation, pleural effusion, or pneumothorax. Shows \"gas beneath the left hemidiaphragm thought to be viewed within the colon and stomach\"; correlated clinically, patient has no abdominal pain or tenderness. I do not suspect pneumoperitoneum. [VG]      ED Course User Index  [VG] Jena Duckworth A, DO             --------------------------------- ADDITIONAL PROVIDER NOTES ---------------------------------  At this time the patient is without objective evidence of an acute process requiring hospitalization or inpatient management. They have remained hemodynamically stable throughout their entire ED visit and are stable for discharge with outpatient follow-up. The plan has been discussed in detail and they are aware of the specific conditions for emergent return, as well as the importance of follow-up. Discharge Medication List as of 12/27/2023  8:27 AM        START taking these medications    Details   tiZANidine (ZANAFLEX) 2 MG tablet Take 1 tablet by mouth every 8 hours as needed (muscle aches), Disp-12 tablet, R-0Normal             Diagnosis:  1. Fatigue, unspecified type    2. Generalized weakness    3. Mild dehydration        Disposition:  Patient's disposition: Discharge to home  Patient's condition is stable.         Jan Bazzi MD  12/27/23 2017

## 2023-12-27 NOTE — DISCHARGE INSTRUCTIONS
Please return to the ER for any new or worsening symptoms including but not limited to Fever, Chest pain or difficulty breathing, or abdominal pain  If prescribed, please be sure to  your prescriptions from the pharmacy  Please follow-up with Primary care provider as instructed

## 2023-12-28 ENCOUNTER — TELEPHONE (OUTPATIENT)
Dept: PRIMARY CARE CLINIC | Age: 43
End: 2023-12-28

## 2023-12-28 NOTE — TELEPHONE ENCOUNTER
My apologies, looking through her chart it looks like she was discharged from my practice, she will need to find another physician.     Thank Jay Fernandez

## 2024-09-24 ENCOUNTER — HOSPITAL ENCOUNTER (OUTPATIENT)
Age: 44
Discharge: HOME | End: 2024-09-24
Payer: MEDICAID

## 2024-09-24 LAB
25(OH)D3 SERPL-MCNC: 14.4 NG/ML (ref 30–100)
ALBUMIN SERPL-MCNC: 4.4 G/DL (ref 3.5–5.2)
ALP SERPL-CCNC: 52 U/L (ref 35–104)
ALT SERPL-CCNC: 11 U/L (ref 0–32)
ANION GAP SERPL CALCULATED.3IONS-SCNC: 15 MMOL/L (ref 7–16)
AST SERPL-CCNC: 25 U/L (ref 0–31)
BASOPHILS # BLD: 0.05 K/UL (ref 0–0.2)
BASOPHILS NFR BLD: 0 % (ref 0–2)
BILIRUB SERPL-MCNC: 0.5 MG/DL (ref 0–1.2)
BUN SERPL-MCNC: 14 MG/DL (ref 6–20)
CALCIUM SERPL-MCNC: 9.6 MG/DL (ref 8.6–10.2)
CHLORIDE SERPL-SCNC: 100 MMOL/L (ref 98–107)
CO2 SERPL-SCNC: 24 MMOL/L (ref 22–29)
CREAT SERPL-MCNC: 1.2 MG/DL (ref 0.5–1)
EOSINOPHIL # BLD: 0.01 K/UL (ref 0.05–0.5)
EOSINOPHILS RELATIVE PERCENT: 0 % (ref 0–6)
ERYTHROCYTE [DISTWIDTH] IN BLOOD BY AUTOMATED COUNT: 13 % (ref 11.5–15)
GFR, ESTIMATED: 56 ML/MIN/1.73M2
GLUCOSE SERPL-MCNC: 119 MG/DL (ref 74–99)
HCT VFR BLD AUTO: 34.1 % (ref 34–48)
HGB BLD-MCNC: 11.4 G/DL (ref 11.5–15.5)
IMM GRANULOCYTES # BLD AUTO: 0.08 K/UL (ref 0–0.58)
IMM GRANULOCYTES NFR BLD: 1 % (ref 0–5)
LYMPHOCYTES NFR BLD: 3.75 K/UL (ref 1.5–4)
LYMPHOCYTES RELATIVE PERCENT: 34 % (ref 20–42)
MAGNESIUM SERPL-MCNC: 2.1 MG/DL (ref 1.6–2.6)
MCH RBC QN AUTO: 32.9 PG (ref 26–35)
MCHC RBC AUTO-ENTMCNC: 33.4 G/DL (ref 32–34.5)
MCV RBC AUTO: 98.3 FL (ref 80–99.9)
MONOCYTES NFR BLD: 0.53 K/UL (ref 0.1–0.95)
MONOCYTES NFR BLD: 5 % (ref 2–12)
NEUTROPHILS NFR BLD: 61 % (ref 43–80)
NEUTS SEG NFR BLD: 6.75 K/UL (ref 1.8–7.3)
PHOSPHATE SERPL-MCNC: 2.4 MG/DL (ref 2.5–4.5)
PLATELET # BLD AUTO: 319 K/UL (ref 130–450)
PMV BLD AUTO: 10.8 FL (ref 7–12)
POTASSIUM SERPL-SCNC: 3 MMOL/L (ref 3.5–5)
PROT SERPL-MCNC: 6.9 G/DL (ref 6.4–8.3)
RBC # BLD AUTO: 3.47 M/UL (ref 3.5–5.5)
SODIUM SERPL-SCNC: 139 MMOL/L (ref 132–146)
WBC OTHER # BLD: 11.2 K/UL (ref 4.5–11.5)

## 2024-09-24 PROCEDURE — 82306 VITAMIN D 25 HYDROXY: CPT

## 2024-09-24 PROCEDURE — 85025 COMPLETE CBC W/AUTO DIFF WBC: CPT

## 2024-09-24 PROCEDURE — 84100 ASSAY OF PHOSPHORUS: CPT

## 2024-09-24 PROCEDURE — 36415 COLL VENOUS BLD VENIPUNCTURE: CPT

## 2024-09-24 PROCEDURE — 83735 ASSAY OF MAGNESIUM: CPT

## 2024-09-24 PROCEDURE — 80053 COMPREHEN METABOLIC PANEL: CPT

## 2024-09-30 ENCOUNTER — HOSPITAL ENCOUNTER (OUTPATIENT)
Age: 44
Discharge: HOME | End: 2024-09-30
Payer: MEDICAID

## 2024-09-30 LAB
25(OH)D3 SERPL-MCNC: 10.4 NG/ML (ref 30–100)
ALBUMIN SERPL-MCNC: 4.2 G/DL (ref 3.5–5.2)
ALP SERPL-CCNC: 47 U/L (ref 35–104)
ALT SERPL-CCNC: 12 U/L (ref 0–32)
ANION GAP SERPL CALCULATED.3IONS-SCNC: 11 MMOL/L (ref 7–16)
AST SERPL-CCNC: 22 U/L (ref 0–31)
BASOPHILS # BLD: 0.04 K/UL (ref 0–0.2)
BASOPHILS NFR BLD: 1 % (ref 0–2)
BILIRUB SERPL-MCNC: 0.4 MG/DL (ref 0–1.2)
BUN SERPL-MCNC: 14 MG/DL (ref 6–20)
CALCIUM SERPL-MCNC: 9.4 MG/DL (ref 8.6–10.2)
CHLORIDE SERPL-SCNC: 103 MMOL/L (ref 98–107)
CO2 SERPL-SCNC: 25 MMOL/L (ref 22–29)
CREAT SERPL-MCNC: 0.9 MG/DL (ref 0.5–1)
EOSINOPHIL # BLD: 0 K/UL (ref 0.05–0.5)
EOSINOPHILS RELATIVE PERCENT: 0 % (ref 0–6)
ERYTHROCYTE [DISTWIDTH] IN BLOOD BY AUTOMATED COUNT: 12.8 % (ref 11.5–15)
GFR, ESTIMATED: 81 ML/MIN/1.73M2
GLUCOSE SERPL-MCNC: 89 MG/DL (ref 74–99)
HCT VFR BLD AUTO: 34.9 % (ref 34–48)
HGB BLD-MCNC: 11.3 G/DL (ref 11.5–15.5)
IMM GRANULOCYTES # BLD AUTO: <0.03 K/UL (ref 0–0.58)
IMM GRANULOCYTES NFR BLD: 0 % (ref 0–5)
LYMPHOCYTES NFR BLD: 3.01 K/UL (ref 1.5–4)
LYMPHOCYTES RELATIVE PERCENT: 45 % (ref 20–42)
MAGNESIUM SERPL-MCNC: 1.9 MG/DL (ref 1.6–2.6)
MCH RBC QN AUTO: 32.3 PG (ref 26–35)
MCHC RBC AUTO-ENTMCNC: 32.4 G/DL (ref 32–34.5)
MCV RBC AUTO: 99.7 FL (ref 80–99.9)
MONOCYTES NFR BLD: 0.35 K/UL (ref 0.1–0.95)
MONOCYTES NFR BLD: 5 % (ref 2–12)
NEUTROPHILS NFR BLD: 49 % (ref 43–80)
NEUTS SEG NFR BLD: 3.31 K/UL (ref 1.8–7.3)
PHOSPHATE SERPL-MCNC: 2.7 MG/DL (ref 2.5–4.5)
PLATELET # BLD AUTO: 311 K/UL (ref 130–450)
PMV BLD AUTO: 10.7 FL (ref 7–12)
POTASSIUM SERPL-SCNC: 3.7 MMOL/L (ref 3.5–5)
PROT SERPL-MCNC: 6.8 G/DL (ref 6.4–8.3)
RBC # BLD AUTO: 3.5 M/UL (ref 3.5–5.5)
SODIUM SERPL-SCNC: 139 MMOL/L (ref 132–146)
WBC OTHER # BLD: 6.7 K/UL (ref 4.5–11.5)

## 2024-09-30 PROCEDURE — 82306 VITAMIN D 25 HYDROXY: CPT

## 2024-09-30 PROCEDURE — 36415 COLL VENOUS BLD VENIPUNCTURE: CPT

## 2024-09-30 PROCEDURE — 84100 ASSAY OF PHOSPHORUS: CPT

## 2024-09-30 PROCEDURE — 80053 COMPREHEN METABOLIC PANEL: CPT

## 2024-09-30 PROCEDURE — 85025 COMPLETE CBC W/AUTO DIFF WBC: CPT

## 2024-09-30 PROCEDURE — 83735 ASSAY OF MAGNESIUM: CPT

## (undated) DEVICE — ELECTRODE PT RET AD L9FT HI MOIST COND ADH HYDRGEL CORDED

## (undated) DEVICE — DOUBLE BASIN SET: Brand: MEDLINE INDUSTRIES, INC.

## (undated) DEVICE — GLOVE SURG SZ 85 L12IN FNGR ORTHO 126MIL CRM LTX FREE

## (undated) DEVICE — SURGICAL PROCEDURE PACK HND

## (undated) DEVICE — SOLUTION IV IRRIG POUR BRL 0.9% SODIUM CHL 2F7124

## (undated) DEVICE — ZIMMER® STERILE DISPOSABLE TOURNIQUET CUFF WITH PLC, DUAL PORT, SINGLE BLADDER, 18 IN. (46 CM)

## (undated) DEVICE — PADDING,UNDERCAST,COTTON, 4"X4YD STERILE: Brand: MEDLINE

## (undated) DEVICE — GLOVE ORANGE PI 8 1/2   MSG9085

## (undated) DEVICE — PENCIL ES CRD L10FT HND SWCHING ROCK SWCH W/ EDGE COAT BLDE